# Patient Record
Sex: MALE | Race: WHITE | NOT HISPANIC OR LATINO | Employment: UNEMPLOYED | URBAN - METROPOLITAN AREA
[De-identification: names, ages, dates, MRNs, and addresses within clinical notes are randomized per-mention and may not be internally consistent; named-entity substitution may affect disease eponyms.]

---

## 2019-01-02 ENCOUNTER — HOSPITAL ENCOUNTER (INPATIENT)
Facility: CLINIC | Age: 54
LOS: 4 days | Discharge: HOME | End: 2019-01-07
Attending: EMERGENCY MEDICINE | Admitting: INTERNAL MEDICINE
Payer: COMMERCIAL

## 2019-01-02 DIAGNOSIS — N40.1 BENIGN PROSTATIC HYPERPLASIA WITH LOWER URINARY TRACT SYMPTOMS, SYMPTOM DETAILS UNSPECIFIED: Primary | ICD-10-CM

## 2019-01-02 DIAGNOSIS — R10.32 LLQ ABDOMINAL PAIN: ICD-10-CM

## 2019-01-02 DIAGNOSIS — K57.92 DIVERTICULITIS: ICD-10-CM

## 2019-01-02 LAB
ALBUMIN UR-MCNC: 10 MG/DL
ANION GAP SERPL CALCULATED.3IONS-SCNC: 7 MMOL/L (ref 3–14)
APPEARANCE UR: CLEAR
BASOPHILS # BLD AUTO: 0 10E9/L (ref 0–0.2)
BASOPHILS NFR BLD AUTO: 0.1 %
BILIRUB UR QL STRIP: NEGATIVE
BUN SERPL-MCNC: 20 MG/DL (ref 7–30)
CALCIUM SERPL-MCNC: 8.6 MG/DL (ref 8.5–10.1)
CHLORIDE SERPL-SCNC: 105 MMOL/L (ref 94–109)
CO2 SERPL-SCNC: 27 MMOL/L (ref 20–32)
COLOR UR AUTO: YELLOW
CREAT SERPL-MCNC: 1.01 MG/DL (ref 0.66–1.25)
DIFFERENTIAL METHOD BLD: ABNORMAL
EOSINOPHIL # BLD AUTO: 0.1 10E9/L (ref 0–0.7)
EOSINOPHIL NFR BLD AUTO: 1 %
ERYTHROCYTE [DISTWIDTH] IN BLOOD BY AUTOMATED COUNT: 14.7 % (ref 10–15)
GFR SERPL CREATININE-BSD FRML MDRD: 84 ML/MIN/{1.73_M2}
GLUCOSE SERPL-MCNC: 81 MG/DL (ref 70–99)
GLUCOSE UR STRIP-MCNC: NEGATIVE MG/DL
HCT VFR BLD AUTO: 41.4 % (ref 40–53)
HGB BLD-MCNC: 13.7 G/DL (ref 13.3–17.7)
HGB UR QL STRIP: NEGATIVE
IMM GRANULOCYTES # BLD: 0 10E9/L (ref 0–0.4)
IMM GRANULOCYTES NFR BLD: 0.3 %
KETONES UR STRIP-MCNC: 10 MG/DL
LACTATE BLD-SCNC: 0.7 MMOL/L (ref 0.7–2)
LEUKOCYTE ESTERASE UR QL STRIP: ABNORMAL
LYMPHOCYTES # BLD AUTO: 2.7 10E9/L (ref 0.8–5.3)
LYMPHOCYTES NFR BLD AUTO: 24.2 %
MCH RBC QN AUTO: 29.5 PG (ref 26.5–33)
MCHC RBC AUTO-ENTMCNC: 33.1 G/DL (ref 31.5–36.5)
MCV RBC AUTO: 89 FL (ref 78–100)
MONOCYTES # BLD AUTO: 0.9 10E9/L (ref 0–1.3)
MONOCYTES NFR BLD AUTO: 8.4 %
MUCOUS THREADS #/AREA URNS LPF: PRESENT /LPF
NEUTROPHILS # BLD AUTO: 7.4 10E9/L (ref 1.6–8.3)
NEUTROPHILS NFR BLD AUTO: 66 %
NITRATE UR QL: NEGATIVE
NRBC # BLD AUTO: 0 10*3/UL
NRBC BLD AUTO-RTO: 0 /100
PH UR STRIP: 6 PH (ref 5–7)
PLATELET # BLD AUTO: 178 10E9/L (ref 150–450)
POTASSIUM SERPL-SCNC: 3.5 MMOL/L (ref 3.4–5.3)
RBC # BLD AUTO: 4.65 10E12/L (ref 4.4–5.9)
RBC #/AREA URNS AUTO: 4 /HPF (ref 0–2)
SODIUM SERPL-SCNC: 139 MMOL/L (ref 133–144)
SOURCE: ABNORMAL
SP GR UR STRIP: 1.03 (ref 1–1.03)
SQUAMOUS #/AREA URNS AUTO: <1 /HPF (ref 0–1)
TRANS CELLS #/AREA URNS HPF: <1 /HPF (ref 0–1)
UROBILINOGEN UR STRIP-MCNC: NORMAL MG/DL (ref 0–2)
WBC # BLD AUTO: 11.2 10E9/L (ref 4–11)
WBC #/AREA URNS AUTO: 5 /HPF (ref 0–5)

## 2019-01-02 PROCEDURE — 99285 EMERGENCY DEPT VISIT HI MDM: CPT | Mod: 25 | Performed by: EMERGENCY MEDICINE

## 2019-01-02 PROCEDURE — 85025 COMPLETE CBC W/AUTO DIFF WBC: CPT | Performed by: EMERGENCY MEDICINE

## 2019-01-02 PROCEDURE — 81001 URINALYSIS AUTO W/SCOPE: CPT | Performed by: EMERGENCY MEDICINE

## 2019-01-02 PROCEDURE — 96375 TX/PRO/DX INJ NEW DRUG ADDON: CPT | Performed by: EMERGENCY MEDICINE

## 2019-01-02 PROCEDURE — 83605 ASSAY OF LACTIC ACID: CPT | Performed by: EMERGENCY MEDICINE

## 2019-01-02 PROCEDURE — 96361 HYDRATE IV INFUSION ADD-ON: CPT | Performed by: EMERGENCY MEDICINE

## 2019-01-02 PROCEDURE — 99285 EMERGENCY DEPT VISIT HI MDM: CPT | Mod: Z6 | Performed by: EMERGENCY MEDICINE

## 2019-01-02 PROCEDURE — 80048 BASIC METABOLIC PNL TOTAL CA: CPT | Performed by: EMERGENCY MEDICINE

## 2019-01-02 PROCEDURE — 25000128 H RX IP 250 OP 636: Performed by: EMERGENCY MEDICINE

## 2019-01-02 RX ORDER — ONDANSETRON 2 MG/ML
4 INJECTION INTRAMUSCULAR; INTRAVENOUS EVERY 30 MIN PRN
Status: DISCONTINUED | OUTPATIENT
Start: 2019-01-02 | End: 2019-01-07 | Stop reason: HOSPADM

## 2019-01-02 RX ORDER — HYDROMORPHONE HYDROCHLORIDE 1 MG/ML
0.5 INJECTION, SOLUTION INTRAMUSCULAR; INTRAVENOUS; SUBCUTANEOUS
Status: COMPLETED | OUTPATIENT
Start: 2019-01-02 | End: 2019-01-02

## 2019-01-02 RX ORDER — SODIUM CHLORIDE 9 MG/ML
1000 INJECTION, SOLUTION INTRAVENOUS CONTINUOUS
Status: DISCONTINUED | OUTPATIENT
Start: 2019-01-02 | End: 2019-01-03

## 2019-01-02 RX ADMIN — ONDANSETRON 4 MG: 2 INJECTION INTRAMUSCULAR; INTRAVENOUS at 23:13

## 2019-01-02 RX ADMIN — SODIUM CHLORIDE 1000 ML: 9 INJECTION, SOLUTION INTRAVENOUS at 23:29

## 2019-01-02 RX ADMIN — Medication 0.5 MG: at 23:13

## 2019-01-02 RX ADMIN — SODIUM CHLORIDE 1000 ML: 9 INJECTION, SOLUTION INTRAVENOUS at 23:13

## 2019-01-02 ASSESSMENT — MIFFLIN-ST. JEOR: SCORE: 1441.38

## 2019-01-03 ENCOUNTER — APPOINTMENT (OUTPATIENT)
Dept: CT IMAGING | Facility: CLINIC | Age: 54
End: 2019-01-03
Payer: COMMERCIAL

## 2019-01-03 PROBLEM — K57.92 DIVERTICULITIS: Status: ACTIVE | Noted: 2019-01-03

## 2019-01-03 LAB — GLUCOSE BLDC GLUCOMTR-MCNC: 112 MG/DL (ref 70–99)

## 2019-01-03 PROCEDURE — 25000128 H RX IP 250 OP 636: Performed by: STUDENT IN AN ORGANIZED HEALTH CARE EDUCATION/TRAINING PROGRAM

## 2019-01-03 PROCEDURE — 96367 TX/PROPH/DG ADDL SEQ IV INF: CPT | Performed by: EMERGENCY MEDICINE

## 2019-01-03 PROCEDURE — 12000001 ZZH R&B MED SURG/OB UMMC

## 2019-01-03 PROCEDURE — 25000128 H RX IP 250 OP 636: Performed by: INTERNAL MEDICINE

## 2019-01-03 PROCEDURE — 87040 BLOOD CULTURE FOR BACTERIA: CPT | Performed by: INTERNAL MEDICINE

## 2019-01-03 PROCEDURE — 36415 COLL VENOUS BLD VENIPUNCTURE: CPT | Performed by: INTERNAL MEDICINE

## 2019-01-03 PROCEDURE — 40000141 ZZH STATISTIC PERIPHERAL IV START W/O US GUIDANCE

## 2019-01-03 PROCEDURE — 25000128 H RX IP 250 OP 636: Performed by: EMERGENCY MEDICINE

## 2019-01-03 PROCEDURE — 00000146 ZZHCL STATISTIC GLUCOSE BY METER IP

## 2019-01-03 PROCEDURE — 25000132 ZZH RX MED GY IP 250 OP 250 PS 637: Performed by: STUDENT IN AN ORGANIZED HEALTH CARE EDUCATION/TRAINING PROGRAM

## 2019-01-03 PROCEDURE — 99223 1ST HOSP IP/OBS HIGH 75: CPT | Mod: AI | Performed by: INTERNAL MEDICINE

## 2019-01-03 PROCEDURE — 40000358 ZZHCL STATISTIC DRUG SCREEN MULTIPLE (METRO): Performed by: STUDENT IN AN ORGANIZED HEALTH CARE EDUCATION/TRAINING PROGRAM

## 2019-01-03 PROCEDURE — 25000125 ZZHC RX 250: Performed by: EMERGENCY MEDICINE

## 2019-01-03 PROCEDURE — 96375 TX/PRO/DX INJ NEW DRUG ADDON: CPT | Performed by: EMERGENCY MEDICINE

## 2019-01-03 PROCEDURE — 96365 THER/PROPH/DIAG IV INF INIT: CPT | Performed by: EMERGENCY MEDICINE

## 2019-01-03 PROCEDURE — 80307 DRUG TEST PRSMV CHEM ANLYZR: CPT | Performed by: STUDENT IN AN ORGANIZED HEALTH CARE EDUCATION/TRAINING PROGRAM

## 2019-01-03 PROCEDURE — 74177 CT ABD & PELVIS W/CONTRAST: CPT

## 2019-01-03 RX ORDER — HYDROMORPHONE HCL/0.9% NACL/PF 0.2MG/0.2
0.2 SYRINGE (ML) INTRAVENOUS EVERY 4 HOURS PRN
Status: DISCONTINUED | OUTPATIENT
Start: 2019-01-03 | End: 2019-01-05

## 2019-01-03 RX ORDER — IOPAMIDOL 755 MG/ML
86 INJECTION, SOLUTION INTRAVASCULAR ONCE
Status: COMPLETED | OUTPATIENT
Start: 2019-01-03 | End: 2019-01-03

## 2019-01-03 RX ORDER — TAMSULOSIN HYDROCHLORIDE 0.4 MG/1
0.4 CAPSULE ORAL DAILY
Status: DISCONTINUED | OUTPATIENT
Start: 2019-01-03 | End: 2019-01-07 | Stop reason: HOSPADM

## 2019-01-03 RX ORDER — SODIUM CHLORIDE, SODIUM LACTATE, POTASSIUM CHLORIDE, CALCIUM CHLORIDE 600; 310; 30; 20 MG/100ML; MG/100ML; MG/100ML; MG/100ML
INJECTION, SOLUTION INTRAVENOUS CONTINUOUS
Status: ACTIVE | OUTPATIENT
Start: 2019-01-03 | End: 2019-01-04

## 2019-01-03 RX ORDER — PIPERACILLIN SODIUM, TAZOBACTAM SODIUM 3; .375 G/15ML; G/15ML
3.38 INJECTION, POWDER, LYOPHILIZED, FOR SOLUTION INTRAVENOUS EVERY 6 HOURS
Status: DISCONTINUED | OUTPATIENT
Start: 2019-01-03 | End: 2019-01-06

## 2019-01-03 RX ORDER — PIPERACILLIN SODIUM, TAZOBACTAM SODIUM 3; .375 G/15ML; G/15ML
3.38 INJECTION, POWDER, LYOPHILIZED, FOR SOLUTION INTRAVENOUS ONCE
Status: COMPLETED | OUTPATIENT
Start: 2019-01-03 | End: 2019-01-03

## 2019-01-03 RX ORDER — KETOROLAC TROMETHAMINE 30 MG/ML
30 INJECTION, SOLUTION INTRAMUSCULAR; INTRAVENOUS ONCE
Status: COMPLETED | OUTPATIENT
Start: 2019-01-03 | End: 2019-01-03

## 2019-01-03 RX ORDER — CIPROFLOXACIN 2 MG/ML
400 INJECTION, SOLUTION INTRAVENOUS ONCE
Status: COMPLETED | OUTPATIENT
Start: 2019-01-03 | End: 2019-01-03

## 2019-01-03 RX ORDER — NALOXONE HYDROCHLORIDE 0.4 MG/ML
.1-.4 INJECTION, SOLUTION INTRAMUSCULAR; INTRAVENOUS; SUBCUTANEOUS
Status: DISCONTINUED | OUTPATIENT
Start: 2019-01-03 | End: 2019-01-07 | Stop reason: HOSPADM

## 2019-01-03 RX ADMIN — PIPERACILLIN SODIUM,TAZOBACTAM SODIUM 3.38 G: 3; .375 INJECTION, POWDER, FOR SOLUTION INTRAVENOUS at 17:35

## 2019-01-03 RX ADMIN — PIPERACILLIN SODIUM,TAZOBACTAM SODIUM 3.38 G: 3; .375 INJECTION, POWDER, FOR SOLUTION INTRAVENOUS at 22:51

## 2019-01-03 RX ADMIN — CIPROFLOXACIN 400 MG: 2 INJECTION, SOLUTION INTRAVENOUS at 03:45

## 2019-01-03 RX ADMIN — SODIUM CHLORIDE, POTASSIUM CHLORIDE, SODIUM LACTATE AND CALCIUM CHLORIDE: 600; 310; 30; 20 INJECTION, SOLUTION INTRAVENOUS at 11:31

## 2019-01-03 RX ADMIN — TAMSULOSIN HYDROCHLORIDE 0.4 MG: 0.4 CAPSULE ORAL at 12:20

## 2019-01-03 RX ADMIN — KETOROLAC TROMETHAMINE 30 MG: 30 INJECTION, SOLUTION INTRAMUSCULAR at 11:05

## 2019-01-03 RX ADMIN — SODIUM CHLORIDE, POTASSIUM CHLORIDE, SODIUM LACTATE AND CALCIUM CHLORIDE: 600; 310; 30; 20 INJECTION, SOLUTION INTRAVENOUS at 18:51

## 2019-01-03 RX ADMIN — SODIUM CHLORIDE, POTASSIUM CHLORIDE, SODIUM LACTATE AND CALCIUM CHLORIDE: 600; 310; 30; 20 INJECTION, SOLUTION INTRAVENOUS at 22:12

## 2019-01-03 RX ADMIN — METRONIDAZOLE 500 MG: 500 INJECTION, SOLUTION INTRAVENOUS at 04:33

## 2019-01-03 RX ADMIN — PIPERACILLIN SODIUM,TAZOBACTAM SODIUM 3.38 G: 3; .375 INJECTION, POWDER, FOR SOLUTION INTRAVENOUS at 11:31

## 2019-01-03 RX ADMIN — SODIUM CHLORIDE, PRESERVATIVE FREE 72 ML: 5 INJECTION INTRAVENOUS at 02:02

## 2019-01-03 RX ADMIN — PIPERACILLIN SODIUM,TAZOBACTAM SODIUM 3.38 G: 3; .375 INJECTION, POWDER, FOR SOLUTION INTRAVENOUS at 04:45

## 2019-01-03 RX ADMIN — Medication 0.2 MG: at 20:07

## 2019-01-03 RX ADMIN — IOPAMIDOL 86 ML: 755 INJECTION, SOLUTION INTRAVENOUS at 02:02

## 2019-01-03 ASSESSMENT — ACTIVITIES OF DAILY LIVING (ADL)
RETIRED_COMMUNICATION: 0-->UNDERSTANDS/COMMUNICATES WITHOUT DIFFICULTY
ADLS_ACUITY_SCORE: 17
ADLS_ACUITY_SCORE: 17
DRESS: 0-->INDEPENDENT
SWALLOWING: 0-->SWALLOWS FOODS/LIQUIDS WITHOUT DIFFICULTY
ADLS_ACUITY_SCORE: 12
TOILETING: 0-->INDEPENDENT
COGNITION: 0 - NO COGNITION ISSUES REPORTED
BATHING: 0-->INDEPENDENT
RETIRED_EATING: 0-->INDEPENDENT
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
## 2019-01-03 NOTE — ED TRIAGE NOTES
Patient presents via EMS for c/o abdominal pain. Patient reports abdominal pain since yesterday. Denies N/V, reports 3-4 soft BMs today. Patient states he was diagnosed with diverticulitis one month ago.  during triage, AOVSS.

## 2019-01-03 NOTE — PROGRESS NOTES
COLON & RECTAL SURGERY PROGRESS NOTE  01/03/2019     Subjective  No acute events since seen in ED overnight by overnight resident. Pain well controlled and mostly in LLQ. No n/v. Does feel antsy and like he wants to walk; c/o aches all over. No other complaints.      Objective  Temp:  [98  F (36.7  C)] 98  F (36.7  C)  Pulse:  [] 84  Resp:  [16] 16  BP: (101-124)/(64-85) 101/71  SpO2:  [93 %-99 %] 96 %    General: awake, alert, no acute distress, laying comfortably in bed   CV: warm, well perfused, RRR   Pulm: breathing comfortably on room air   Abdomen: soft, non-distended, exquisitely tender to moderate palpation with focal guarding in the LLQ, minimally tender throughout remainder of abdomen, no generalized peritonitis   Extremities: no edema, moving all extremities spontaneously and without apparent deficit     I/O last 3 completed shifts:  In: 43504 [I.V.:1000; IV Piggyback:14489]  Out: -     Labs:  Recent Labs   Lab 01/02/19  2217   WBC 11.2*   HGB 13.7        Recent Labs   Lab 01/02/19  2217      POTASSIUM 3.5   CHLORIDE 105   CO2 27   BUN 20   CR 1.01   GLC 81   GUILLERMO 8.6       Assessment/Plan  53 year old  with a h/o TBI and recurrent diverticulitis (1-2 attacks every 2 years, most recently 1 month ago treated with po cipro/Flagyl) who presented for admission on 1/2/2019 with recurrent complicated diverticulitis with a 2.3 x 1.4 x 2.2 cm para-sigmoid colonic abscess ([place] I).    - IV Zosyn  - NPO until patient having almost no pain, we will continue to follow for diet advancement recommendations  - Trend WBC  - Patient will need colonoscopy as outpatient in 6-8 weeks.  - Consider Social Work/Care Management evaluation for assistance at home; patient reports difficulty managing health needs due to memory issues from TBI.  - Remainder of cares per primary team, appreciate cares.      Patient and plan discussed with fellow Dr. Dyer, who will discuss with attending physician.  - - -  - - - - - - - - - - - - - - -  Jovana Euceda MD  General Surgery PGY-3  See [facility] for on call information prior to paging me directly. Pager [phone].

## 2019-01-03 NOTE — PLAN OF CARE
"/65   Pulse 92   Temp 98.7  F (37.1  C) (Oral)   Resp 16   Ht 1.702 m (5' 7\")   Wt 64.3 kg (141 lb 11.2 oz)   SpO2 97%   BMI 22.19 kg/m      Neuro: A&O x4; cooperative with cares; flat affect  Cardiac: WNL  Respiratory: 97% RA; denies SOB  GI/: Voiding independently; faint BS; no BM for shift  Skin: appropriate for ethnicity  Pain: Managed with PRN dilaudid  LDA: PIV /hr and IV abx as prescribed  Activity: Up independently  Diet/Appetite: NPO with meds  Plan: Continue with plan of care    New for shift: Pt went outside and came back with a non-functioning PIV; MD notified; UA tox screen sent and awaiting results  "

## 2019-01-03 NOTE — ED NOTES
"This RN to bedside, pt reporting pain to R lower face, chin after abx infusion. Denies itchiness, hives, SOB, rash. Reports skin there feels \"dry\". Slight redness noted to R lower face and chin, non raised. MD notified, en route for assessment. VSS. Will continue to monitor.   "
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
## 2019-01-04 LAB
ACETAMINOPHEN QUAL: NEGATIVE
AMANTADINE: NEGATIVE
AMITRIPTYLINE QUAL: NEGATIVE
AMOXAPINE: NEGATIVE
AMPHETAMINES QUAL: POSITIVE
ANION GAP SERPL CALCULATED.3IONS-SCNC: 7 MMOL/L (ref 3–14)
ATROPINE: NEGATIVE
BENZODIAZ UR QL: NEGATIVE
BUN SERPL-MCNC: 7 MG/DL (ref 7–30)
CAFFEINE QUAL: POSITIVE
CALCIUM SERPL-MCNC: 8.5 MG/DL (ref 8.5–10.1)
CANNABINOIDS UR QL SCN: POSITIVE
CARBAMAZEPINE QUAL: NEGATIVE
CHLORIDE SERPL-SCNC: 106 MMOL/L (ref 94–109)
CHLORPHENIRAMINE: NEGATIVE
CHLORPROMAZINE: NEGATIVE
CITALOPRAM QUAL: NEGATIVE
CLOMIPRAMINE QUAL: NEGATIVE
CO2 SERPL-SCNC: 25 MMOL/L (ref 20–32)
COCAINE QUAL: NEGATIVE
COCAINE UR QL: NEGATIVE
CODEINE QUAL: NEGATIVE
CREAT SERPL-MCNC: 0.99 MG/DL (ref 0.66–1.25)
DESIPRAMINE QUAL: NEGATIVE
DEXTROMETHORPHAN: NEGATIVE
DIPHENHYDRAMINE: NEGATIVE
DOXEPIN/METABOLITE: NEGATIVE
DOXYLAMINE: NEGATIVE
EPHEDRINE OR PSEUDO: NEGATIVE
ERYTHROCYTE [DISTWIDTH] IN BLOOD BY AUTOMATED COUNT: 14.5 % (ref 10–15)
FENTANYL QUAL: NEGATIVE
FLUOXETINE AND METAB: NEGATIVE
GFR SERPL CREATININE-BSD FRML MDRD: 86 ML/MIN/{1.73_M2}
GLUCOSE SERPL-MCNC: 82 MG/DL (ref 70–99)
HCT VFR BLD AUTO: 38.2 % (ref 40–53)
HGB BLD-MCNC: 12.8 G/DL (ref 13.3–17.7)
HYDROCODONE QUAL: NEGATIVE
HYDROMORPHONE QUAL: NEGATIVE
IBUPROFEN QUAL: NEGATIVE
IMIPRAMINE QUAL: NEGATIVE
LAMOTRIGINE QUAL: NEGATIVE
LOXAPINE: NEGATIVE
MAPROTYLINE: NEGATIVE
MCH RBC QN AUTO: 29.7 PG (ref 26.5–33)
MCHC RBC AUTO-ENTMCNC: 33.5 G/DL (ref 31.5–36.5)
MCV RBC AUTO: 89 FL (ref 78–100)
MDMA QUAL: NEGATIVE
MEPERIDINE QUAL: NEGATIVE
METHAMPHETAMINE: POSITIVE
METHODONE QUAL: NEGATIVE
MORPHINE QUAL: NEGATIVE
NICOTINE: POSITIVE
NORTRIPTYLINE QUAL: NEGATIVE
OLANZAPINE QUAL: NEGATIVE
OPIATES UR QL SCN: NEGATIVE
OXYCODONE QUAL: NEGATIVE
PENTAZOCINE: NEGATIVE
PHENCYCLIDINE QUAL: NEGATIVE
PHENMETRAZINE: NEGATIVE
PHENTERMINE: NEGATIVE
PHENYLBUTAZONE: NEGATIVE
PHENYLPROPANOLAMINE: NEGATIVE
PLATELET # BLD AUTO: 203 10E9/L (ref 150–450)
POTASSIUM SERPL-SCNC: 3.8 MMOL/L (ref 3.4–5.3)
PROPOXPHENE QUAL: NEGATIVE
PROPRANOLOL QUAL: NEGATIVE
PYRILAMINE: NEGATIVE
RBC # BLD AUTO: 4.31 10E12/L (ref 4.4–5.9)
SALICYLATE QUAL: NEGATIVE
SODIUM SERPL-SCNC: 138 MMOL/L (ref 133–144)
THEOBROMINE: POSITIVE
TOPIRAMATE QUAL: NEGATIVE
TRIMIPRAMINE QUAL: NEGATIVE
VENLAFAXINE QUAL: NEGATIVE
WBC # BLD AUTO: 8.8 10E9/L (ref 4–11)

## 2019-01-04 PROCEDURE — 85027 COMPLETE CBC AUTOMATED: CPT | Performed by: INTERNAL MEDICINE

## 2019-01-04 PROCEDURE — 25000128 H RX IP 250 OP 636: Performed by: INTERNAL MEDICINE

## 2019-01-04 PROCEDURE — 25000128 H RX IP 250 OP 636: Performed by: STUDENT IN AN ORGANIZED HEALTH CARE EDUCATION/TRAINING PROGRAM

## 2019-01-04 PROCEDURE — 12000001 ZZH R&B MED SURG/OB UMMC

## 2019-01-04 PROCEDURE — 36415 COLL VENOUS BLD VENIPUNCTURE: CPT | Performed by: INTERNAL MEDICINE

## 2019-01-04 PROCEDURE — 99233 SBSQ HOSP IP/OBS HIGH 50: CPT | Mod: GC | Performed by: INTERNAL MEDICINE

## 2019-01-04 PROCEDURE — 25000132 ZZH RX MED GY IP 250 OP 250 PS 637: Performed by: STUDENT IN AN ORGANIZED HEALTH CARE EDUCATION/TRAINING PROGRAM

## 2019-01-04 PROCEDURE — 80048 BASIC METABOLIC PNL TOTAL CA: CPT | Performed by: INTERNAL MEDICINE

## 2019-01-04 RX ORDER — KETOROLAC TROMETHAMINE 30 MG/ML
30 INJECTION, SOLUTION INTRAMUSCULAR; INTRAVENOUS EVERY 6 HOURS PRN
Status: DISPENSED | OUTPATIENT
Start: 2019-01-04 | End: 2019-01-06

## 2019-01-04 RX ORDER — SODIUM CHLORIDE, SODIUM LACTATE, POTASSIUM CHLORIDE, CALCIUM CHLORIDE 600; 310; 30; 20 MG/100ML; MG/100ML; MG/100ML; MG/100ML
INJECTION, SOLUTION INTRAVENOUS CONTINUOUS
Status: DISCONTINUED | OUTPATIENT
Start: 2019-01-04 | End: 2019-01-05

## 2019-01-04 RX ADMIN — PIPERACILLIN SODIUM,TAZOBACTAM SODIUM 3.38 G: 3; .375 INJECTION, POWDER, FOR SOLUTION INTRAVENOUS at 16:57

## 2019-01-04 RX ADMIN — KETOROLAC TROMETHAMINE 30 MG: 30 INJECTION, SOLUTION INTRAMUSCULAR at 15:53

## 2019-01-04 RX ADMIN — SODIUM CHLORIDE, POTASSIUM CHLORIDE, SODIUM LACTATE AND CALCIUM CHLORIDE: 600; 310; 30; 20 INJECTION, SOLUTION INTRAVENOUS at 11:54

## 2019-01-04 RX ADMIN — Medication 0.2 MG: at 19:45

## 2019-01-04 RX ADMIN — PIPERACILLIN SODIUM,TAZOBACTAM SODIUM 3.38 G: 3; .375 INJECTION, POWDER, FOR SOLUTION INTRAVENOUS at 05:58

## 2019-01-04 RX ADMIN — PIPERACILLIN SODIUM,TAZOBACTAM SODIUM 3.38 G: 3; .375 INJECTION, POWDER, FOR SOLUTION INTRAVENOUS at 11:01

## 2019-01-04 RX ADMIN — TAMSULOSIN HYDROCHLORIDE 0.4 MG: 0.4 CAPSULE ORAL at 08:08

## 2019-01-04 RX ADMIN — Medication 0.2 MG: at 01:31

## 2019-01-04 RX ADMIN — Medication 0.2 MG: at 11:12

## 2019-01-04 RX ADMIN — SODIUM CHLORIDE, POTASSIUM CHLORIDE, SODIUM LACTATE AND CALCIUM CHLORIDE: 600; 310; 30; 20 INJECTION, SOLUTION INTRAVENOUS at 15:49

## 2019-01-04 RX ADMIN — SODIUM CHLORIDE, POTASSIUM CHLORIDE, SODIUM LACTATE AND CALCIUM CHLORIDE: 600; 310; 30; 20 INJECTION, SOLUTION INTRAVENOUS at 06:55

## 2019-01-04 RX ADMIN — PIPERACILLIN SODIUM,TAZOBACTAM SODIUM 3.38 G: 3; .375 INJECTION, POWDER, FOR SOLUTION INTRAVENOUS at 22:59

## 2019-01-04 ASSESSMENT — ACTIVITIES OF DAILY LIVING (ADL)
ADLS_ACUITY_SCORE: 12

## 2019-01-04 NOTE — PLAN OF CARE
"BP 96/54 (BP Location: Left arm)   Pulse 89   Temp 99.4  F (37.4  C) (Oral)   Resp 16   Ht 1.702 m (5' 7\")   Wt 64.3 kg (141 lb 11.2 oz)   SpO2 95%   BMI 22.19 kg/m        Neuro:  Alert and oriented. Slept between most cares, aroused to voice.  GI/: Voiding not saving, +flatus, +BM today.  Diet: NPO x meds  Incisions/Drains: n/a  IV Access: PIV on left infusing IVMF per MAR  Labs: Drugs of abuse tox screen pending. + for cannabinoids  Pain: Medicated x1 with PRN dilaudid.  New changes this shift: Patient appears to rest in bed most shift  Activity: Up ad edenilson independently,   Plan: Continue IV abx, continue POC.  "

## 2019-01-04 NOTE — PLAN OF CARE
"Activity: Up ad edenilson independently, off floor twice for \"fresh air\" per pt report, when arrived back to unit 7B staff noted his PIV was improperly connected to his IVMF tubing.  Neuro: Slept between most cares, aroused to voice.  GI/: Voiding not saving, +flatus, +BM today.  Diet: NPO x meds  Incisions/Drains: n/a  IV Access: PIV on left infusing IVMF per MAR  Labs: Drugs of abuse tox screen pending. + for cannabinoids  Pain: Medicated x1 with PRN dilaudid.  New changes this shift: Medical team informed of low BP and patient's off-unit status.  Bolus administered per order.  Plan: Continue IV abx, continue POC.    "

## 2019-01-04 NOTE — PROGRESS NOTES
Colorectal Surgery Progress Note  HD#3    Subjective:  No acute events overnight. Patient noted to have left the floor twice, returning with PIV improperly connected, tox screen pending. Patient reports not feeling great, no changes compared to yesterday. No nausea.vomiting. +flatus, Last BM yesterday- loose and small. Discussed with patient options if he does not improve, and patient expressed that he is adamant about no surgical interventions.    Vitals:  Vitals:    01/03/19 0800 01/03/19 0954 01/03/19 1700 01/03/19 2354   BP: 95/86 103/65 (!) 87/48 96/54   BP Location:    Left arm   Pulse: 96 92 100 89   Resp:  16 19 16   Temp:  98.7  F (37.1  C) 100.7  F (38.2  C) 99.4  F (37.4  C)   TempSrc:  Oral Oral Oral   SpO2: 99% 97% 90% 95%   Weight:       Height:         I/O:  I/O last 3 completed shifts:  In: -   Out: 500 [Urine:500]    Physical Exam:  Gen: AAOx3, NAD, laying in bed, looks tired  Pulm: Non-labored breathing on room air  Abd: Soft, non-distended, mildly tender to palpation throughout with guarding, worse in LLQ. No peritonitis. Old scar infraumbilical  Ext:  Warm and well-perfused, no edema    BMP  Recent Labs   Lab 01/02/19  2217      POTASSIUM 3.5   CHLORIDE 105   CO2 27   BUN 20   CR 1.01   GLC 81     CBC  Recent Labs   Lab 01/04/19  0653 01/02/19  2217   WBC 8.8 11.2*   HGB 12.8* 13.7   HCT 38.2* 41.4    178         ASSESSMENT: This is a 53 year old male with history of a TBI and recurrent diverticulitis (1-2 attacks every 2 years, most [facility]), who is HD#3 for admission for recurrent complicated diverticulitis with a 2.3 x 1.4 x 2.2 cm para-sigmoid colonic abscess ([place] I). Stable, still c/o pain, but overall appears to be slightly improved since admission.    - Continue IV Zosyn  - Continue NPO, surgery will give diet recommendations daily.   - Notify surgery with any acute changes in exam or clinical picture  - Remainder of cares per  primary team, appreciate cares  - Patient will need colonoscopy outpatient in 6-8 weeks.    Phillip Antony, PGY1  General Surgery Resident  Colon and Rectal Surgery    Patient was seen and discussed with Dr. Dyer, CRS Fellow, who discussed with staff, Dr. Lucas.

## 2019-01-04 NOTE — PLAN OF CARE
"/64 (BP Location: Left arm)   Pulse 89   Temp 97.5  F (36.4  C) (Oral)   Resp 16   Ht 1.702 m (5' 7\")   Wt 64.3 kg (141 lb 11.2 oz)   SpO2 96%   BMI 22.19 kg/m      Neuro: A&O x4; cooperative with cares  Cardiac: WNL  Respiratory: 96% RA; denies SOB  GI/: Voiding independently; faint BS; no BM for shift; abdomen distended  Skin: Appropriate for ethnicity  Pain: PRN dilaudid/toradol available  LDA: PIV /hr and IV abx as prescribed  Activity: Up independently  Diet/Appetite: NPO except for meds  Plan: Continue with plan of care    "

## 2019-01-04 NOTE — PROGRESS NOTES
"[facility], [place]    Progress Note - [place] 2 Service        Date of Admission:  1/2/2019    Assessment & Plan   Grant Swift is a 53 year old male with history significant for diverticulitis admitted on 1/2/2019 with 1-2 week duration of LLQ abdominal discomfort with significant increase in pain over the last 2 days. CT abdomen w/ contrast demonstrates inflammatory changes and colonic wall thickening involving the sigmoid colon consistent with diverticulitis. Suspected rupture along the inferior margin of the sigmoid colon near the left inguinal canal with a small contained abscess measuring approximately 1.4 x 2.3 x 2.2cm. No indication for acute surgical intervention at this time, and plan is for continued observation with IV antibiotics and bowel rest. Patient expressed discontent with being in the hospital and stated \"I can just take antibiotics at home\", explained rationale of IV antibiotics with bowel rest and need for maintenance fluids in the interim for treatment of his condition.     # Acute colonic diverticulitis with sigmoid colon wall perforation and abscess:  Plan for conservative medical management with outpatient follow-up and colonoscopy in 6-8 weeks if condition improves:  - NPO  - Maintenance fluids  - IV Zosyn  - Pain management - IV Toradol and PRN Dilaudid     # Increased urination frequency/urgency with discomfort:  UA does not suggest infectious process such as UTI or prostatitis, likely 2/2 BPH:  - Continue tamsulosin      # Poor appetite:  Reported by patient at baseline with no significant change since onset of abdominal pain:  - Consider nutrition consult once patient able to resume PO intake     # Chronic issues:  - Tobacco use: encourage cessation, offer nicotine replacement.  - Difficulty managing uncontrolled underlying diseases likely contributing to recurrent admission: possible SW/OT consult.       Diet: NPO  Fluids: LR @125cc/hr  DVT " Prophylaxis: Pneumatic Compression Devices  Pierre Catheter: not present  Code Status: DNR/DNI    Disposition Plan   Expected discharge: 2 - 3 days, recommended to prior living arrangement once adequate pain management/ tolerating PO medications.  Entered: Margarito Stanton 01/04/2019, 6:56 AM       The patient's care was discussed with the Attending Physician, Dr. Ludin Platt.    Margarito Stanton  [facility] 2 Service  [facility], [place]  Pager: [phone]  Please see sticky note for cross cover information  ______________________________________________________________________    Interval History   FREDDIE, patient progressing well. Pain controlled with IV Toradol and PRN Dilaudid. Slightly hypotensive yesterday evening but asymptomatic and received 500cc LR bolus.     Data reviewed today: I reviewed all medications, new labs and imaging results over the last 24 hours. I personally reviewed no images or EKG's today.    Physical Exam   Vital Signs: Temp: 99.4  F (37.4  C) Temp src: Oral BP: 96/54 Pulse: 89   Resp: 16 SpO2: 95 % O2 Device: None (Room air)    Weight: 141 lbs 11.2 oz    Constitutional: Awake, alert, cooperative, no apparent distress, and appears stated age  Eyes: PERRL, extra ocular muscles intact, sclera clear, conjunctiva normal  ENT: Normocephalic, without obvious abnormality, atraumatic, oral pharynx with moist mucous membranes  Respiratory: No increased work of breathing, good air exchange, clear to auscultation bilaterally, no crackles or wheezing  Cardiovascular: Regular rate and rhythm, no M/G/R  GI: Normal bowel sounds, soft, non-distended, no masses palpated, no hepatosplenomegaly, LLQ quadrant sharp pain on palpation without rebound tenderness  Skin: normal skin color, texture, turgor  Musculoskeletal: No lower extremity pitting edema present, there is no redness, warmth, or swelling of the joints, full range of motion noted  Neurologic: Awake, alert, oriented to name, place and  time. Cranial nerves II-XII are grossly intact.  Neuropsychiatric: General: normal, calm and normal eye contact    Data   Data reviewed - Trending WBC down to 8.8 from 11.2 today.

## 2019-01-05 LAB
ANION GAP SERPL CALCULATED.3IONS-SCNC: 7 MMOL/L (ref 3–14)
BUN SERPL-MCNC: 8 MG/DL (ref 7–30)
CALCIUM SERPL-MCNC: 7.7 MG/DL (ref 8.5–10.1)
CHLORIDE SERPL-SCNC: 108 MMOL/L (ref 94–109)
CO2 SERPL-SCNC: 24 MMOL/L (ref 20–32)
CREAT SERPL-MCNC: 1.04 MG/DL (ref 0.66–1.25)
GFR SERPL CREATININE-BSD FRML MDRD: 81 ML/MIN/{1.73_M2}
GLUCOSE SERPL-MCNC: 76 MG/DL (ref 70–99)
POTASSIUM SERPL-SCNC: 3.7 MMOL/L (ref 3.4–5.3)
SODIUM SERPL-SCNC: 139 MMOL/L (ref 133–144)

## 2019-01-05 PROCEDURE — 99233 SBSQ HOSP IP/OBS HIGH 50: CPT | Mod: GC | Performed by: INTERNAL MEDICINE

## 2019-01-05 PROCEDURE — 25000128 H RX IP 250 OP 636: Performed by: INTERNAL MEDICINE

## 2019-01-05 PROCEDURE — 25000132 ZZH RX MED GY IP 250 OP 250 PS 637: Performed by: STUDENT IN AN ORGANIZED HEALTH CARE EDUCATION/TRAINING PROGRAM

## 2019-01-05 PROCEDURE — 36415 COLL VENOUS BLD VENIPUNCTURE: CPT | Performed by: INTERNAL MEDICINE

## 2019-01-05 PROCEDURE — 12000001 ZZH R&B MED SURG/OB UMMC

## 2019-01-05 PROCEDURE — 25000128 H RX IP 250 OP 636: Performed by: STUDENT IN AN ORGANIZED HEALTH CARE EDUCATION/TRAINING PROGRAM

## 2019-01-05 PROCEDURE — 80048 BASIC METABOLIC PNL TOTAL CA: CPT | Performed by: INTERNAL MEDICINE

## 2019-01-05 RX ORDER — ACETAMINOPHEN 325 MG/1
650 TABLET ORAL EVERY 6 HOURS
Status: DISCONTINUED | OUTPATIENT
Start: 2019-01-05 | End: 2019-01-07 | Stop reason: HOSPADM

## 2019-01-05 RX ADMIN — Medication 0.2 MG: at 04:43

## 2019-01-05 RX ADMIN — SODIUM CHLORIDE, POTASSIUM CHLORIDE, SODIUM LACTATE AND CALCIUM CHLORIDE: 600; 310; 30; 20 INJECTION, SOLUTION INTRAVENOUS at 08:37

## 2019-01-05 RX ADMIN — TAMSULOSIN HYDROCHLORIDE 0.4 MG: 0.4 CAPSULE ORAL at 08:38

## 2019-01-05 RX ADMIN — PIPERACILLIN SODIUM,TAZOBACTAM SODIUM 3.38 G: 3; .375 INJECTION, POWDER, FOR SOLUTION INTRAVENOUS at 04:43

## 2019-01-05 RX ADMIN — PIPERACILLIN SODIUM,TAZOBACTAM SODIUM 3.38 G: 3; .375 INJECTION, POWDER, FOR SOLUTION INTRAVENOUS at 11:21

## 2019-01-05 RX ADMIN — ACETAMINOPHEN 650 MG: 325 TABLET, FILM COATED ORAL at 09:27

## 2019-01-05 RX ADMIN — KETOROLAC TROMETHAMINE 30 MG: 30 INJECTION, SOLUTION INTRAMUSCULAR at 17:46

## 2019-01-05 RX ADMIN — KETOROLAC TROMETHAMINE 30 MG: 30 INJECTION, SOLUTION INTRAMUSCULAR at 09:27

## 2019-01-05 RX ADMIN — SODIUM CHLORIDE, POTASSIUM CHLORIDE, SODIUM LACTATE AND CALCIUM CHLORIDE: 600; 310; 30; 20 INJECTION, SOLUTION INTRAVENOUS at 00:18

## 2019-01-05 RX ADMIN — Medication 0.2 MG: at 00:26

## 2019-01-05 RX ADMIN — PIPERACILLIN SODIUM,TAZOBACTAM SODIUM 3.38 G: 3; .375 INJECTION, POWDER, FOR SOLUTION INTRAVENOUS at 23:08

## 2019-01-05 RX ADMIN — PIPERACILLIN SODIUM,TAZOBACTAM SODIUM 3.38 G: 3; .375 INJECTION, POWDER, FOR SOLUTION INTRAVENOUS at 17:12

## 2019-01-05 ASSESSMENT — MIFFLIN-ST. JEOR: SCORE: 1442.28

## 2019-01-05 ASSESSMENT — ACTIVITIES OF DAILY LIVING (ADL)
ADLS_ACUITY_SCORE: 12

## 2019-01-05 NOTE — PROGRESS NOTES
"[facility], [place]    Progress Note - [facility] 2 Service        Date of Admission:  1/2/2019    Assessment & Plan   Grant Swift is a 53 year old male with history significant for diverticulitis admitted on 1/2/2019 with 1-2 week duration of LLQ abdominal discomfort with significant increase in pain over the last 2 days. CT abdomen w/ contrast demonstrates inflammatory changes and colonic wall thickening involving the sigmoid colon consistent with diverticulitis. Suspected rupture along the inferior margin of the sigmoid colon near the left inguinal canal with a small contained abscess measuring approximately 1.4 x 2.3 x 2.2cm. No indication for acute surgical intervention at this time, and plan is for continued observation with IV antibiotics and bowel rest. Patient expressed discontent with being in the hospital and stated \"I can just take antibiotics at home\", explained rationale of IV antibiotics with bowel rest and need for maintenance fluids in the interim for treatment of his condition.     # Acute complicated sigmoid diverticulitis, Hinchey I  # Para-sigmoid abscess  Plan for conservative medical management with outpatient follow-up and colonoscopy in 6-8 weeks if condition improves:  - CLD, Advance as tolerate  - IV Zosyn  - Pain management - IV Toradol prn AND Tylenol scheduled  - Appreciate surgery recs     # Increased urination frequency/urgency with discomfort:  UA does not suggest infectious process such as UTI or prostatitis, likely 2/2 BPH:  - Continue tamsulosin      # Poor appetite:  Reported by patient at baseline with no significant change since onset of abdominal pain:  - Consider nutrition consult once patient able to resume PO intake     # Chronic issues:  - Tobacco use: encourage cessation, offer nicotine replacement.  - Difficulty managing uncontrolled underlying diseases likely contributing to recurrent admission: possible SW/OT consult.  - Drug abuse: DSA " positive for meth and marijuana     Diet: CLD, Advance as tolerate  Fluids: LR @125cc/hr - dc'ed 1/5  DVT Prophylaxis: Pneumatic Compression Devices  Pierre Catheter: not present  Code Status: DNR/DNI    Disposition Plan   Expected discharge: 2 - 3 days, recommended to prior living arrangement once adequate pain management/ tolerating PO medications.  Entered: Mike Reed MD 01/05/2019, 10:57 AM       The patient's care was discussed with the Attending Physician, Dr. Ludin Platt.    Margarito Kuhn [address] [place] [address], [place]  Pager: [phone]  Please see sticky note for cross cover information  ______________________________________________________________________    Interval History   NAEO. DSA positive for meth and marijuana. No fever or chills. Abd pain impeoved overnight. No n/v/d.    Data reviewed today: I reviewed all medications, new labs and imaging results over the last 24 hours. I personally reviewed no images or EKG's today.    Physical Exam   Vital Signs: Temp: 98.1  F (36.7  C) Temp src: Oral BP: 111/69 Pulse: 85   Resp: 16 SpO2: 93 % O2 Device: None (Room air)    Weight: 141 lbs 11.2 oz    Constitutional: Awake, alert, cooperative, no apparent distress, and appears stated age  Cardiovascular: RRR  Lungs: CTAB  GI: Normal bowel sounds, soft NTND. LLQ tenderness with no rebound/gaurding.  Skin: normal skin color, texture, turgor  Musculoskeletal: No lower extremity pitting edema  Neurologic: Awake, alert, oriented to name, place and time.    Data   Data reviewed

## 2019-01-05 NOTE — PLAN OF CARE
"Activity: Ambulated outside x1 for \"fresh air\" independently  Neuro: Slept between most cares. Awake long enough to ask for pain meds, then back asleep.  GI/: Voiding not saving, reports \"about 10\" bowel movements today, says they are becoming more loose.  Diet: NPO  Incisions/Drains: n/a  IV Access: PIV on right infusing LR at 125mL/hr between abx.  Labs: Results from drugs of abuse tox screen have returned, see lab activity for results.  Pain: Medicated with PRN toradol and PRN dilaudid per MAR.  New changes this shift: none  Plan: Continue POC.    "

## 2019-01-05 NOTE — PLAN OF CARE
VS:  Afebrile, VSS with RA.  LABS:  Minor changes to BMP results, no CBC today.  NEURO:  Alert, oriented x4.  Sleeping most of shift.    PAIN:  Pain control improved with iv Toradol and scheduled acetaminophen.  Pt reports discomfort in LLQ though not as intense as earlier this AM.  DIET:  Advanced to clears, pt taking adequate oral fluids.  Inquiring about advancing further, discussed with pt going slowly on diet per discussion with MD during rounds this AM.  GI:  Denies nausea.  Reports loose, watery BM x3 thus far today though forgets to save for assessment.    :  Voiding adequate amounts per pt report, not saving voids.  SKIN:  Declined full skin assessment.  ACTIVITY:  Up in room ad edenilson.  LINE:  MIV fluid discontinued, PIV saline locked between iv Zosyn doses.  PLAN:  Continue to assess pain and effectiveness of new pain regimen.  Assess BMs and update primary team prn - may need Cdiff testing.  Encourage activity and po fluid intake.  Continue current plan of care, update Hattie 2 team prn.

## 2019-01-05 NOTE — PROGRESS NOTES
Colorectal Surgery Progress Note  HD#4    Subjective:  No acute events overnight. Patient feels better this morning. Feels hungry. +flatus, +BM. Denies pain.    Vitals:  Vitals:    01/04/19 0700 01/04/19 1500 01/04/19 2356 01/05/19 0021   BP: 96/63 104/64 (!) 88/48 90/52   BP Location: Right leg Left arm Left arm Left arm   Pulse: 89 89 87    Resp: 16 16 16    Temp: 96.5  F (35.8  C) 97.5  F (36.4  C) 97.1  F (36.2  C)    TempSrc: Axillary Oral Oral    SpO2: 94% 96% 96%    Weight:       Height:         I/O:  I/O last 3 completed shifts:  In: 1972.92 [I.V.:1972.92]  Out: -     Physical Exam:  Gen: AAOx3, NAD  Pulm: Non-labored breathing on room air  Abd: Soft, non-distended, non-tender to palpation. Old infraumbilical scar.  Ext:  Warm and well-perfused, no edema    BMP  Recent Labs   Lab 01/05/19  0629 01/04/19  0653 01/02/19  2217    138 139   POTASSIUM 3.7 3.8 3.5   CHLORIDE 108 106 105   CO2 24 25 27   BUN 8 7 20   CR 1.04 0.99 1.01   GLC 76 82 81     CBC  Recent Labs   Lab 01/04/19  0653 01/02/19  2217   WBC 8.8 11.2*   HGB 12.8* 13.7   HCT 38.2* 41.4    178         ASSESSMENT: This is a 53 year old male with history of a TBI and recurrent diverticulitis (1-2 attacks every 2 years, [facility]), who is HD#4 for admission for recurrent complicated diverticulitis with a 2.3 x 1.4 x 2.2 cm para-sigmoid colonic abscess. Stable and improved.    - OK for clear liquid diet as long as he is pain free. If becomes nauseous, bloated, or has pain make NPO.  - Continue IV Zosyn.  - Notify surgery with any acute changes in exam or clinical picture.  - Remainder of cares per primary team, appreciate cares.  - Patient will need colonoscopy outpatient in 6-8 weeks.    Phillip Antony, PGY1  General Surgery Resident  Colon and Rectal Surgery    Patient was seen and discussed with Dr. Dyer, CRS Fellow, who discussed with staff.

## 2019-01-05 NOTE — PLAN OF CARE
Patient denies SOB, clear LS. VSS on RA. Hypo BS, +flatus.  Abdominal pain is well controlled and has improved as verbalized by pt. Dilaudid 0.2 mg given 2x. On Zosyn. Voiding not saving. MIVF at 125 ml/hr. Continue poc.   Vitals:    01/04/19 0700 01/04/19 1500 01/04/19 2356 01/05/19 0021   BP: 96/63 104/64 (!) 88/48 90/52   BP Location: Right leg Left arm Left arm Left arm   Pulse: 89 89 87    Resp: 16 16 16    Temp: 96.5  F (35.8  C) 97.5  F (36.4  C) 97.1  F (36.2  C)    TempSrc: Axillary Oral Oral    SpO2: 94% 96% 96%    Weight:       Height:

## 2019-01-06 LAB
ANION GAP SERPL CALCULATED.3IONS-SCNC: 6 MMOL/L (ref 3–14)
BUN SERPL-MCNC: 8 MG/DL (ref 7–30)
CALCIUM SERPL-MCNC: 8 MG/DL (ref 8.5–10.1)
CHLORIDE SERPL-SCNC: 111 MMOL/L (ref 94–109)
CO2 SERPL-SCNC: 24 MMOL/L (ref 20–32)
CREAT SERPL-MCNC: 0.99 MG/DL (ref 0.66–1.25)
ERYTHROCYTE [DISTWIDTH] IN BLOOD BY AUTOMATED COUNT: 14.3 % (ref 10–15)
GFR SERPL CREATININE-BSD FRML MDRD: 86 ML/MIN/{1.73_M2}
GLUCOSE SERPL-MCNC: 83 MG/DL (ref 70–99)
HCT VFR BLD AUTO: 37.7 % (ref 40–53)
HGB BLD-MCNC: 12.4 G/DL (ref 13.3–17.7)
MCH RBC QN AUTO: 29.2 PG (ref 26.5–33)
MCHC RBC AUTO-ENTMCNC: 32.9 G/DL (ref 31.5–36.5)
MCV RBC AUTO: 89 FL (ref 78–100)
PLATELET # BLD AUTO: 239 10E9/L (ref 150–450)
POTASSIUM SERPL-SCNC: 3.8 MMOL/L (ref 3.4–5.3)
RBC # BLD AUTO: 4.25 10E12/L (ref 4.4–5.9)
SODIUM SERPL-SCNC: 141 MMOL/L (ref 133–144)
WBC # BLD AUTO: 7.2 10E9/L (ref 4–11)

## 2019-01-06 PROCEDURE — 12000001 ZZH R&B MED SURG/OB UMMC

## 2019-01-06 PROCEDURE — 25000132 ZZH RX MED GY IP 250 OP 250 PS 637: Performed by: STUDENT IN AN ORGANIZED HEALTH CARE EDUCATION/TRAINING PROGRAM

## 2019-01-06 PROCEDURE — 25000128 H RX IP 250 OP 636: Performed by: STUDENT IN AN ORGANIZED HEALTH CARE EDUCATION/TRAINING PROGRAM

## 2019-01-06 PROCEDURE — 80048 BASIC METABOLIC PNL TOTAL CA: CPT | Performed by: INTERNAL MEDICINE

## 2019-01-06 PROCEDURE — 85027 COMPLETE CBC AUTOMATED: CPT | Performed by: INTERNAL MEDICINE

## 2019-01-06 PROCEDURE — 99233 SBSQ HOSP IP/OBS HIGH 50: CPT | Mod: GC | Performed by: INTERNAL MEDICINE

## 2019-01-06 PROCEDURE — 25000128 H RX IP 250 OP 636: Performed by: INTERNAL MEDICINE

## 2019-01-06 PROCEDURE — 36415 COLL VENOUS BLD VENIPUNCTURE: CPT | Performed by: INTERNAL MEDICINE

## 2019-01-06 RX ORDER — CEFDINIR 300 MG/1
300 CAPSULE ORAL 2 TIMES DAILY
Status: DISCONTINUED | OUTPATIENT
Start: 2019-01-06 | End: 2019-01-07 | Stop reason: HOSPADM

## 2019-01-06 RX ORDER — OXYCODONE HYDROCHLORIDE 5 MG/1
5 TABLET ORAL EVERY 6 HOURS PRN
Status: DISCONTINUED | OUTPATIENT
Start: 2019-01-06 | End: 2019-01-07 | Stop reason: HOSPADM

## 2019-01-06 RX ORDER — METRONIDAZOLE 500 MG/1
500 TABLET ORAL EVERY 8 HOURS SCHEDULED
Status: DISCONTINUED | OUTPATIENT
Start: 2019-01-06 | End: 2019-01-07 | Stop reason: HOSPADM

## 2019-01-06 RX ADMIN — METRONIDAZOLE 500 MG: 500 TABLET ORAL at 12:14

## 2019-01-06 RX ADMIN — ACETAMINOPHEN 650 MG: 325 TABLET, FILM COATED ORAL at 16:02

## 2019-01-06 RX ADMIN — CEFDINIR 300 MG: 300 CAPSULE ORAL at 12:13

## 2019-01-06 RX ADMIN — PIPERACILLIN SODIUM,TAZOBACTAM SODIUM 3.38 G: 3; .375 INJECTION, POWDER, FOR SOLUTION INTRAVENOUS at 04:31

## 2019-01-06 RX ADMIN — METRONIDAZOLE 500 MG: 500 TABLET ORAL at 16:02

## 2019-01-06 RX ADMIN — METRONIDAZOLE 500 MG: 500 TABLET ORAL at 22:24

## 2019-01-06 RX ADMIN — ACETAMINOPHEN 650 MG: 325 TABLET, FILM COATED ORAL at 09:27

## 2019-01-06 RX ADMIN — CEFDINIR 300 MG: 300 CAPSULE ORAL at 19:56

## 2019-01-06 RX ADMIN — TAMSULOSIN HYDROCHLORIDE 0.4 MG: 0.4 CAPSULE ORAL at 09:23

## 2019-01-06 RX ADMIN — KETOROLAC TROMETHAMINE 30 MG: 30 INJECTION, SOLUTION INTRAMUSCULAR at 04:31

## 2019-01-06 RX ADMIN — Medication 1 MG: at 22:27

## 2019-01-06 ASSESSMENT — ACTIVITIES OF DAILY LIVING (ADL)
ADLS_ACUITY_SCORE: 12
ADLS_ACUITY_SCORE: 13

## 2019-01-06 ASSESSMENT — MIFFLIN-ST. JEOR: SCORE: 1440.47

## 2019-01-06 NOTE — PLAN OF CARE
Activity: Slept between most cares  Neuro: Arouses to touch, gentle shaking. Able to make needs known.  GI/: Reports BMs today, voiding not saving.  Diet: Advanced to clears, good tolerance  Incisions/Drains: n/a  IV Access: PIV on right infusing abx, saline locked in between  Labs: WNL  Pain: Medicated with PRN toradol with good effect.  New changes this shift: Desirous to leave the hospital AMA this afternoon, changed his mind after visit from Medicine team.  Plan: Continue POC

## 2019-01-06 NOTE — PROGRESS NOTES
COLON & RECTAL SURGERY PROGRESS NOTE  01/06/2019     Subjective  NAEON. Pain well controlled; declining scheduled Tylenol, no narcotic use since yesterday AM. Tolerating clear liquid diet without n/v. Had 4 stools yesterday. No other complaints.      Objective  Temp:  [96.3  F (35.7  C)-98.1  F (36.7  C)] 97.5  F (36.4  C)  Pulse:  [66-85] 77  Resp:  [16-18] 16  BP: ()/(52-69) 93/52  SpO2:  [93 %-95 %] 94 %    General: AAOx4, NAD, laying comfortably in bed  CV: Warm, well perfused  Pulm: nonlabored breathing on room air  Abdomen: soft, non-distended, nontender throughout  Extremities: no edema, moving all spontaneously and without apparent deficit    I/O last 3 completed shifts:  In: 800 [P.O.:700; I.V.:100]  Out: -   Urine 4x  Stool 4x    Labs:  Recent Labs   Lab 01/06/19  0647 01/04/19  0653 01/02/19  2217   WBC 7.2 8.8 11.2*   HGB 12.4* 12.8* 13.7    203 178           Assessment/Plan  53 year old male with history of a TBI and recurrent diverticulitis (1-2 attacks every 2 years, most [facility] 1 [facility]), who was admitted on  for admission for recurrent complicated diverticulitis with a 2.3 x 1.4 x 2.2 cm para-sigmoid colonic abscess. Stable and improved, now with no pain and having BMs.     - Transition to po antibiotics, recommend cefpodoxime/Flagyl for a total 14-day course.  - Agree with low residue diet.  - Patient will need outpatient colonoscopy in 6-8 weeks (ordered in Discharge Navigator, discussed with primary team that the patient does definitively need this done and may need additional assistance arranging either with [facility] or the [facility] - they plan to coordinate with Care Management to get patient additional resources for managing his healthcare).  - No specific follow up with Colorectal Surgery is needed as patient is not interested in surgical intervention, but he may call our clinic for follow up if he wants to further discuss (provided info in an order in  Discharge Navigator).  - Colorectal Surgery will sign off, but please do not hesitate to contact us with questions or concerns.      Patient and plan discussed with fellow Dr. Dyer, who will discuss with attending physician Dr. Cuevas.  - - - - - - - - - - - - - - - - - -  Jovana Euceda MD  General Surgery PGY-3  See [facility] for on call information prior to paging me directly. Pager [phone].

## 2019-01-06 NOTE — PROGRESS NOTES
[facility], [place]    Progress Note - [facility] 2 Service        Date of Admission:  1/2/2019    Assessment & Plan   Grant Swift is a 53 year old male with history significant for diverticulitis admitted on 1/2/2019 with 1-2 week duration of LLQ abdominal discomfort with significant increase in pain over the last 2 days. CT abdomen w/ contrast demonstrates inflammatory changes and colonic wall thickening involving the sigmoid colon consistent with diverticulitis. Suspected rupture along the inferior margin of the sigmoid colon near the left inguinal canal with a small contained abscess measuring approximately 1.4 x 2.3 x 2.2cm. No indication for acute surgical intervention at this time. Patient expressed discontent with being in the hospital, explained rationale of monitoring if patient continues to do well on PO antibiotic regimen and with normal PO diet. Anticipate discharge to home tomorrow morning if condition continues to improve.     # Acute complicated sigmoid diverticulitis, Hinchey I  # Para-sigmoid abscess  Plan for conservative medical management with outpatient follow-up and colonoscopy in 6-8 weeks:  - Advance as tolerated to regular diet  - Transition to PO antibiotic regimen (cefpodoxime/metronidazole for 14 days)  - Pain management - IV Toradol prn AND Tylenol scheduled  - Appreciate surgery recs     # Increased urination frequency/urgency with discomfort:  UA does not suggest infectious process such as UTI or prostatitis, likely 2/2 BPH:  - Continue tamsulosin      # Poor appetite:  Reported by patient at baseline with no significant change since onset of abdominal pain:  - Consider nutrition consult once patient able to resume PO intake     # Chronic issues:  - Tobacco use: encourage cessation, offer nicotine replacement.  - Difficulty managing uncontrolled underlying diseases likely contributing to recurrent admission: possible SW/OT consult.  - Drug abuse: DSA  positive for meth and marijuana     Diet: CLD, Advance as tolerate  Fluids: None  DVT Prophylaxis: Pneumatic Compression Devices  Pierre Catheter: not present  Code Status: DNR/DNI    Disposition Plan   Expected discharge: Tomorrow, recommended to prior living arrangement once adequate pain management/ tolerating PO medications.  Entered: Margarito Stanton 01/06/2019, 11:21 AM       The patient's care was discussed with the Attending Physician, Dr. Ludin Platt.    Margarito Stanton  [facility] 2 Service  [facility], [place]  Pager: [phone]  Please see sticky note for cross cover information  ______________________________________________________________________    Interval History   FREDDIE, patient reports abdominal discomfort improving and that he is tolerating PO liquid diet well. Reports loose BM, no traces of blood.    Data reviewed today: I reviewed all medications, new labs and imaging results over the last 24 hours. I personally reviewed no images or EKG's today.    Physical Exam   Vital Signs: Temp: 97  F (36.1  C) Temp src: Axillary BP: 136/70 Pulse: 70   Resp: 16 SpO2: 95 % O2 Device: None (Room air)    Weight: 141 lbs 14.4 oz    Constitutional: Awake, alert, cooperative, no apparent distress, and appears stated age  Cardiovascular: RRR  Lungs: CTAB  GI: Normal bowel sounds, soft NT/ND. Moderate LLQ tenderness with no rebound/guarding  Skin: normal skin color, texture, turgor  Musculoskeletal: No lower extremity pitting edema  Neurologic: Awake, alert, oriented to name, place and time.      Data   Recent Labs   Lab 01/06/19  0647 01/05/19  0629 01/04/19  0653 01/02/19  2217   WBC 7.2  --  8.8 11.2*   HGB 12.4*  --  12.8* 13.7   MCV 89  --  89 89     --  203 178    139 138 139   POTASSIUM 3.8 3.7 3.8 3.5   CHLORIDE 111* 108 106 105   CO2 24 24 25 27   BUN 8 8 7 20   CR 0.99 1.04 0.99 1.01   ANIONGAP 6 7 7 7   GUILLERMO 8.0* 7.7* 8.5 8.6   GLC 83 76 82 81

## 2019-01-06 NOTE — PLAN OF CARE
"Activity: Transfers independently. Currently sleeping.   Neuros: Alert and oriented X4.  Cardiac: BP 93/52 (BP Location: Left arm)   Pulse 77   Temp 97.5  F (36.4  C) (Oral)   Resp 16   Ht 1.702 m (5' 7\")   Wt 64.4 kg (141 lb 14.4 oz)   SpO2 94%   BMI 22.22 kg/m     Respiratory: Oxygen Saturation at 96%RA.   GI/: Not passing flatus or stool. Voiding adequately.  Diet: Clear liquid diet.   Skin: Intact.  Lines: Right PIV/infusing TKO.  Incisions/Drains: No active drains or incisions.   Labs: CBC with platelets and BMP scheduled this morning.   Pain/nausea: Denying any nausea. Abdominal pain/Ketorolac every 6hrs prn.   New changes this shift: No new changes.   Plan: Continue with plan of care.       "

## 2019-01-06 NOTE — DISCHARGE SUMMARY
[facility], [place]  Discharge Summary - Medicine & Pediatrics       Date of Admission:  1/2/2019  Date of Discharge:  1/7/2019  Discharging Provider: Dr. Ludin Platt  Discharge Service: Hattie 2    Discharge Diagnoses   Acute complicated sigmoid diverticulitis     Follow-ups Needed After Discharge   Follow-up Appointments     Follow Up ([facility])      You do not need to follow up specifically with Colorectal Surgery since you are not interested in surgical intervention for your diverticulitis, but you are welcome to make an appointment in our clinic at any time if you would like to discuss anything further.     To make an appointment, please contact the Colorectal Surgery RNs (Ankur Dudley LPN, Zoraida Watson, RN or Susie Slaughter RN) at [phone].             Colonoscopy in 6-8 weeks either with [facility] Colorectal Surgery or with [facility] Colorectal Surgery.    Unresulted Labs Ordered in the Past 30 Days of this Admission     Date and Time Order Name Status Description    1/3/2019 1802 Blood culture Preliminary     1/3/2019 1802 Blood culture Preliminary       These results will be followed, but no growth to date at this time.    Hospital Course   Grant Swift was admitted on 1/2/2019 with history significant for diverticulitis and 1-2 week duration of worsening LLQ abdominal discomfort. CT abdomen w/ contrast demonstrates inflammatory changes and colonic wall thickening involving the sigmoid colon consistent with diverticulitis. Suspected rupture along the inferior margin of the sigmoid colon near the left inguinal canal with a small contained abscess measuring approximately 1.4 x 2.3 x 2.2cm.    The following problems were addressed during his hospitalization:    # Acute complicated sigmoid diverticulitis, Hinchey I  # Para-sigmoid abscess  On presentation, the patient was afebrile and hemodynamically stable with significant LLQ abdominal pain on exam. Following evaluation by  Surgery, no indication for acute surgical intervention and patient unwilling to pursue surgical treatment options. Patient was managed with NPO bowel rest and IV antibiotics followed by PO antibiotics for his acute complicated sigmoid diverticulitis (Hinchey I) and para-sigmoid colon abscess. Hospital course was uncomplicated and patient's condition improved over several days with bowel rest and administration of IV antibiotics. Patient continued to improve even after diet was advanced and transition was made to PO antibiotic regiment was made. Management to continue with 14-day course of antibiotics and follow-up colonoscopy in 6-8 weeks as an outpatient:  - NPO with maintenance fluids from 1/2/19 to 1/5/19; diet advanced to clear liquid on 1/5/19 followed by advance to regular diet on 1/6/19  - IV Zosyn from 1/2/19 to 1/6/19 with transition to PO antibiotic regimen on 1/6/19 (cefdinir/metronidazole for 14 days)     # Increased urination frequency/urgency with discomfort:  Patient presented with reports of some difficulty with urination in regards to increased frequency and urination. UA obtained not suggestive of infectious process such as UTI or prostatitis, likely 2/2 BPH:  - Tamsulosin started, recommend follow-up with PCP for further management     # Chronic issues:  - Tobacco use: encouraged cessation, offered nicotine replacement.  - Drug abuse: DSA positive for methamphetamine and marijuana    Consultations This Hospital Stay   VASCULAR ACCESS CARE ADULT IP CONSULT    Code Status   DNR/DNI       The patient was discussed with Dr. Ludin Kuhn 2 Service  [facility], [place]  Pager: [phone]  ______________________________________________________________________    Physical Exam   Vital Signs: Temp: 96  F (35.6  C) Temp src: Oral BP: 109/70 Pulse: 85   Resp: 20 SpO2: 97 % O2 Device: None (Room air)    Weight: 141 lbs 14.4 oz    Constitutional: AAOx4, NAD,  and appears stated age  Eyes: PERRL, extra ocular muscles intact, sclera clear, conjunctiva normal  ENT: Normocephalic, without obvious abnormality, atraumatic, oral pharynx with moist mucous membranes  Respiratory: No increased work of breathing, good air exchange, clear to auscultation bilaterally, no crackles or wheezing  Cardiovascular: Regular rate and rhythm, no M/G/R  GI: Normal bowel sounds, soft, non-distended, no masses palpated, no hepatosplenomegaly, mild LLQ quadrant pain on palpation without rebound tenderness  Skin: normal skin color, texture, turgor  Musculoskeletal: No lower extremity pitting edema present, there is no redness, warmth, or swelling of the joints, full range of motion noted  Neurologic: Awake, alert, oriented to name, place and time. Cranial nerves II-XII are grossly intact.  Neuropsychiatric: General: normal, calm and normal eye contact      Primary Care Physician   [facility]    Discharge Disposition   Discharged to home  Condition at discharge: Stable    Significant Results and Procedures   Most Recent 3 CBC's:  Recent Labs   Lab Test 01/06/19  0647 01/04/19  0653 01/02/19  2217   WBC 7.2 8.8 11.2*   HGB 12.4* 12.8* 13.7   MCV 89 89 89    203 178     Most Recent 3 BMP's:  Recent Labs   Lab Test 01/06/19  0647 01/05/19  0629 01/04/19  0653    139 138   POTASSIUM 3.8 3.7 3.8   CHLORIDE 111* 108 106   CO2 24 24 25   BUN 8 8 7   CR 0.99 1.04 0.99   ANIONGAP 6 7 7   GUILLERMO 8.0* 7.7* 8.5   GLC 83 76 82   ,   Results for orders placed or performed during the hospital encounter of 01/02/19   CT Abdomen Pelvis w Contrast    Narrative    Examination:  CT ABDOMEN PELVIS W CONTRAST 1/3/2019 2:17 AM     History: Diverticulitis suspected    Comparison: None available    Technique: CT of the abdomen and pelvis were obtained with IV  contrast. Sagittal and coronal reconstructions created and reviewed.    Findings:   Colonic wall thickening and inflammatory  changes in the sigmoid colon  associated with extensive diverticulitis diverticulosis. Suspected  rupture and small abscess formation along the inferior and anterior  margin of the sigmoid colon on series 4 image 34 and series 3 image  334 measuring approximately 2.3 x 1.4 x 2.2 cm. Adjacent inflammatory  changes in the mesenteric fat.  The remainder of the colon is within  normal limits, the exception of diverticulosis involving the majority  of the colon, less on the right compared to the left. Left iliac chain  adenopathy is likely reactive. The appendix is seen within a right  inguinal hernia. There is moderate thickening of the distal esophagus.      Focal fatty infiltration along the falciform ligament. The liver is  otherwise unremarkable. The spleen, pancreas, and adrenal glands are  within normal limits. Nonobstructing 3 mm left renal stone. 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 There is a focal  hypodensity in the superior cortex of the right kidney, too small to  characterize, possibly a calyceal diverticulum or a cyst. There is no  hydronephrosis. The bladder is mildly distended. Coarse prostatic  calcifications. The abdominal aorta is nondilated.     Lung bases: No focal consolidation. There is a subpleural nodule in  the right lower lobe measuring 6 mm on series 3 image 5. Bibasilar  atelectasis.    Bones and soft tissues: No acute bony abnormalities. Bilateral pars  defects at L5-S1 and anterolisthesis of L5. Prior left lower quadrant  hernia repair with mesh.      Impression    Impression:   1. Inflammatory changes and colonic wall thickening involving the  sigmoid colon consistent with diverticulitis. Suspected rupture along  the inferior margin of the sigmoid colon near the left inguinal canal  with a small contained abscess measuring approximately 1.4 x 2.3 x 2.2  cm.  2. Right lower lobe nodule measuring 6 mm.  Consider baseline CT chest  and follow up, particularly if high risk.     3. Moderate thickening of the distal esophagus and GE junction, most  compatible with reflux esophagitis.  4. Non obstructing renal calculi in the left kidney.    I have personally reviewed the examination and initial interpretation  and I agree with the findings.    HERNANDEZ SAENZ MD       Discharge Orders      GASTROENTEROLOGY ADULT REF PROCEDURE ONLY      Follow Up ([facility])    You do not need to follow up specifically with Colorectal Surgery since you are not interested in surgical intervention for your diverticulitis, but you are welcome to make an appointment in our clinic at any time if you would like to discuss anything further.     To make an appointment, please contact the Colorectal Surgery RNs (Ankur Dudley LPN, Zoraida Watson, AUREA or Susie Slaughter RN) at [phone].     Discharge Medications   Current Discharge Medication List      CONTINUE these medications which have NOT CHANGED    Details   ACETAMINOPHEN PO Take 500 mg by mouth every 4 hours as needed Takes 1-2 tabs      IBUPROFEN PO Take 200 mg by mouth every 6 hours as needed Patient takes 3-4 tabs           Allergies   Allergies   Allergen Reactions     Morphine Sulfate Itching

## 2019-01-06 NOTE — PLAN OF CARE
IV antibiotics discontinued, started on oral antibiotics this afternoon. Diet advanced to regular from clear liquids, which he is tolerating well. Denies nausea or pain after eating. PIV SL, up independently, going outside to smoke. Plan for discharge home tomorrow if stable on oral antibiotics. Mild abdominal pain, some relief with Tylenol, has new order for PRN Oxycodone if needed.  Diet order adjusted to low fiber.

## 2019-01-07 VITALS
OXYGEN SATURATION: 95 % | HEIGHT: 67 IN | SYSTOLIC BLOOD PRESSURE: 131 MMHG | WEIGHT: 141.5 LBS | RESPIRATION RATE: 18 BRPM | TEMPERATURE: 95.6 F | BODY MASS INDEX: 22.21 KG/M2 | HEART RATE: 83 BPM | DIASTOLIC BLOOD PRESSURE: 85 MMHG

## 2019-01-07 PROCEDURE — 99239 HOSP IP/OBS DSCHRG MGMT >30: CPT | Mod: GC | Performed by: INTERNAL MEDICINE

## 2019-01-07 PROCEDURE — 25000132 ZZH RX MED GY IP 250 OP 250 PS 637: Performed by: STUDENT IN AN ORGANIZED HEALTH CARE EDUCATION/TRAINING PROGRAM

## 2019-01-07 RX ORDER — METRONIDAZOLE 500 MG/1
500 TABLET ORAL EVERY 8 HOURS
Qty: 42 TABLET | Refills: 0 | Status: SHIPPED | OUTPATIENT
Start: 2019-01-07 | End: 2019-01-21

## 2019-01-07 RX ORDER — TAMSULOSIN HYDROCHLORIDE 0.4 MG/1
0.4 CAPSULE ORAL DAILY
Qty: 30 CAPSULE | Refills: 0 | Status: SHIPPED | OUTPATIENT
Start: 2019-01-07 | End: 2019-02-06

## 2019-01-07 RX ORDER — CEFDINIR 300 MG/1
300 CAPSULE ORAL 2 TIMES DAILY
Qty: 28 CAPSULE | Refills: 0 | Status: SHIPPED | OUTPATIENT
Start: 2019-01-07 | End: 2019-01-21

## 2019-01-07 RX ADMIN — CEFDINIR 300 MG: 300 CAPSULE ORAL at 08:02

## 2019-01-07 RX ADMIN — TAMSULOSIN HYDROCHLORIDE 0.4 MG: 0.4 CAPSULE ORAL at 08:02

## 2019-01-07 RX ADMIN — METRONIDAZOLE 500 MG: 500 TABLET ORAL at 06:02

## 2019-01-07 ASSESSMENT — ACTIVITIES OF DAILY LIVING (ADL)
ADLS_ACUITY_SCORE: 13

## 2019-01-07 NOTE — PROGRESS NOTES
Pt is alert and oriented up independently denies pain and nausea had good appetite.LS clear,BS+ ,reported x 2 loose BM this morning and adequate urinary output.Pt discharged home in stable condition.discharge instructions provided by charge nurse.

## 2019-01-07 NOTE — PLAN OF CARE
Activity: Up ad edenilson, ambulated outside independently  Neuro: Affect seems improved AEB more smiles seen today versus yesterday.  GI/: Reports +BM, +flatus, voiding not saving.  Diet: Low-fiber diet with good tolerance, no c/o n/v.  Incisions/Drains: n/a  IV Access: PIV on Right saline locked  Labs: none of note  Vitals: WNL, afebrile, on RA.  Pain: medicated with scheduled tylenol with good result.  New changes this shift: PO abx instead of IV, going well.  Plan: Possible discharge tomorrow, continue POC.

## 2019-01-07 NOTE — PLAN OF CARE
Neuro: alert and oriented  GI/: voiding not saving  Diet: low fiber diet  IV Access:RFA IV is saline locked  Activity:independent  Pain: denied pain  New changes this shift:none  Plan: continue with plan of care, patient is planning on going home today

## 2019-01-08 ENCOUNTER — PATIENT OUTREACH (OUTPATIENT)
Dept: CARE COORDINATION | Facility: CLINIC | Age: 54
End: 2019-01-08

## 2019-01-09 LAB
BACTERIA SPEC CULT: NO GROWTH
BACTERIA SPEC CULT: NO GROWTH
Lab: NORMAL
Lab: NORMAL
SPECIMEN SOURCE: NORMAL
SPECIMEN SOURCE: NORMAL

## 2021-05-25 ENCOUNTER — RECORDS - HEALTHEAST (OUTPATIENT)
Dept: ADMINISTRATIVE | Facility: CLINIC | Age: 56
End: 2021-05-25

## 2021-05-27 ENCOUNTER — RECORDS - HEALTHEAST (OUTPATIENT)
Dept: ADMINISTRATIVE | Facility: CLINIC | Age: 56
End: 2021-05-27

## 2021-05-28 ENCOUNTER — RECORDS - HEALTHEAST (OUTPATIENT)
Dept: ADMINISTRATIVE | Facility: CLINIC | Age: 56
End: 2021-05-28

## 2021-05-29 ENCOUNTER — RECORDS - HEALTHEAST (OUTPATIENT)
Dept: ADMINISTRATIVE | Facility: CLINIC | Age: 56
End: 2021-05-29

## 2021-06-02 ENCOUNTER — RECORDS - HEALTHEAST (OUTPATIENT)
Dept: ADMINISTRATIVE | Facility: CLINIC | Age: 56
End: 2021-06-02

## 2022-09-09 ENCOUNTER — ANCILLARY PROCEDURE (OUTPATIENT)
Dept: BONE DENSITY | Facility: CLINIC | Age: 57
End: 2022-09-09
Attending: FAMILY MEDICINE

## 2022-09-09 DIAGNOSIS — M85.80 OTHER SPECIFIED DISORDERS OF BONE DENSITY AND STRUCTURE, UNSPECIFIED SITE: ICD-10-CM

## 2022-09-09 PROCEDURE — 77080 DXA BONE DENSITY AXIAL: CPT

## 2024-05-13 ENCOUNTER — HOSPITAL ENCOUNTER (EMERGENCY)
Facility: CLINIC | Age: 59
Discharge: LEFT BEFORE DISCHARGE | End: 2024-05-13
Attending: EMERGENCY MEDICINE | Admitting: EMERGENCY MEDICINE
Payer: COMMERCIAL

## 2024-05-13 VITALS
HEART RATE: 106 BPM | RESPIRATION RATE: 20 BRPM | OXYGEN SATURATION: 95 % | DIASTOLIC BLOOD PRESSURE: 81 MMHG | SYSTOLIC BLOOD PRESSURE: 120 MMHG | TEMPERATURE: 98.2 F

## 2024-05-13 DIAGNOSIS — R10.84 GENERALIZED ABDOMINAL PAIN: ICD-10-CM

## 2024-05-13 LAB
ALBUMIN UR-MCNC: 10 MG/DL
APPEARANCE UR: CLEAR
BILIRUB UR QL STRIP: NEGATIVE
COLOR UR AUTO: YELLOW
GLUCOSE UR STRIP-MCNC: NEGATIVE MG/DL
HGB UR QL STRIP: ABNORMAL
KETONES UR STRIP-MCNC: NEGATIVE MG/DL
LEUKOCYTE ESTERASE UR QL STRIP: ABNORMAL
MUCOUS THREADS #/AREA URNS LPF: PRESENT /LPF
NITRATE UR QL: NEGATIVE
PH UR STRIP: 5.5 [PH] (ref 5–7)
RBC URINE: 1 /HPF
SP GR UR STRIP: 1.02 (ref 1–1.03)
SQUAMOUS EPITHELIAL: <1 /HPF
TRANSITIONAL EPI: <1 /HPF
UROBILINOGEN UR STRIP-MCNC: NORMAL MG/DL
WBC URINE: 3 /HPF

## 2024-05-13 PROCEDURE — 99285 EMERGENCY DEPT VISIT HI MDM: CPT | Mod: 25 | Performed by: EMERGENCY MEDICINE

## 2024-05-13 PROCEDURE — 81003 URINALYSIS AUTO W/O SCOPE: CPT | Performed by: EMERGENCY MEDICINE

## 2024-05-13 PROCEDURE — 99284 EMERGENCY DEPT VISIT MOD MDM: CPT | Performed by: EMERGENCY MEDICINE

## 2024-05-13 RX ORDER — ACETAMINOPHEN 325 MG/1
975 TABLET ORAL ONCE
Status: DISCONTINUED | OUTPATIENT
Start: 2024-05-13 | End: 2024-05-13 | Stop reason: HOSPADM

## 2024-05-13 RX ORDER — OXYCODONE HYDROCHLORIDE 5 MG/1
5 TABLET ORAL ONCE
Status: DISCONTINUED | OUTPATIENT
Start: 2024-05-13 | End: 2024-05-13 | Stop reason: HOSPADM

## 2024-05-13 RX ORDER — ONDANSETRON 2 MG/ML
4 INJECTION INTRAMUSCULAR; INTRAVENOUS ONCE
Status: DISCONTINUED | OUTPATIENT
Start: 2024-05-13 | End: 2024-05-13 | Stop reason: HOSPADM

## 2024-05-13 ASSESSMENT — COLUMBIA-SUICIDE SEVERITY RATING SCALE - C-SSRS
2. HAVE YOU ACTUALLY HAD ANY THOUGHTS OF KILLING YOURSELF IN THE PAST MONTH?: NO
6. HAVE YOU EVER DONE ANYTHING, STARTED TO DO ANYTHING, OR PREPARED TO DO ANYTHING TO END YOUR LIFE?: YES
1. IN THE PAST MONTH, HAVE YOU WISHED YOU WERE DEAD OR WISHED YOU COULD GO TO SLEEP AND NOT WAKE UP?: NO

## 2024-05-13 ASSESSMENT — ACTIVITIES OF DAILY LIVING (ADL)
ADLS_ACUITY_SCORE: 33

## 2024-05-13 NOTE — ED TRIAGE NOTES
BIBA for c/o constipation and fever. Pt c/o intermittent abdominal pain 9/10 sharp and cramping pain.  Last BM: 1 hour ago, very soft.  Recent hernia repair surgery 1 month ago.      Patient tried enema last night but it did not help.    99 blood glucose    Patient states he wants to be full code.    Pt denies CP, endorses some SOB overnight but not at present.

## 2024-05-13 NOTE — ED PROVIDER NOTES
[place] EMERGENCY DEPARTMENT ([facility])    5/13/24       ED PROVIDER NOTE        History     Chief Complaint   Patient presents with     Post-op Problem     HPI  Grant Swift is a 58 year old male who is 1 month status post right inguinal hernia repair and presents to the emergency department due to abdominal pain, fever and nausea.  He reports since his surgery he has had frequent diarrhea approximately 10 episodes of loose stools a day however, very small volume.  He has also noted right testicular pain since the time of his surgery.  [facility] and was felt to have constipation.  He was given an enema to use at home.  He reports he used an enema last night and had worsening symptoms including increasing pain.  Additionally, he reports he is febrile to 102.  He has been nauseated but denies vomiting.  This morning he did have a large bowel movement but continues to have ongoing pain.  The pain seems to be primarily in the right lower quadrant but is present throughout the abdomen.  He reports his he has been urinating but has had frequent urination and more difficult flow.  He continues to have right-sided testicular pain but has not noted any increased swelling or redness.      This part of the medical record was transcribed by JOEL VARGAS Medical Scribe, from a dictation done by Deborah Moar MD.     Past Medical History  Past Medical History:   Diagnosis Date     Depressive disorder      Head injury 2013     No past surgical history on file.  ACETAMINOPHEN PO  IBUPROFEN PO      Allergies   Allergen Reactions     Morphine Sulfate Itching     Family History  No family history on file.  Social History   Social History     Tobacco Use     Smoking status: Every Day     Current packs/day: 0.50     Average packs/day: 0.5 packs/day for 32.0 years (16.0 ttl pk-yrs)     Types: Cigarettes     Smokeless tobacco: Never     Tobacco comments:     6-10  cigarettes a day   Substance Use Topics     Alcohol use: No     Drug use: Yes     Types: Marijuana      Past medical history, past surgical history, medications, allergies, family history, and social history were reviewed with the patient. No additional pertinent items.      A complete review of systems was performed with pertinent positives and negatives noted in the HPI, and all other systems negative.    Physical Exam   BP: 120/81  Pulse: 106  Temp: 98.2  F (36.8  C)  Resp: 20  SpO2: 95 %  Physical Exam  General: patient is alert and oriented, uncomfortable appearing  Head: atraumatic and normocephalic   EENT: moist mucus membranes, sclera anicteric   Neck: supple   Cardiovascular: regular rate and rhythm, extremities warm and well perfused, no lower extremity edema  Pulmonary: lungs clear to auscultation bilaterally   Abdomen: soft, TTP in the RLQ, no guarding  : circumcised male, normal testicular lie, no redness or swelling noted  Musculoskeletal: normal range of motion   Neurological: alert and oriented, moving all extremities symmetrically, gait normal   Skin: warm, dry       ED Course, Procedures, & Data      Procedures            No results found for any visits on 05/13/24.  Medications   sodium chloride 0.9% BOLUS 1,000 mL (has no administration in time range)   oxyCODONE (ROXICODONE) tablet 5 mg (has no administration in time range)   acetaminophen (TYLENOL) tablet 975 mg (has no administration in time range)   ondansetron (ZOFRAN) injection 4 mg (has no administration in time range)     Labs Ordered and Resulted from Time of ED Arrival to Time of ED Departure - No data to display  CT Abdomen Pelvis w Contrast    (Results Pending)   US Testicular & Scrotum w Doppler Ltd    (Results Pending)          Critical care was not performed.     Medical Decision Making  The patient's presentation was of high complexity (an acute health issue posing potential threat to life or bodily function).    The patient's  evaluation involved:  ordering and/or review of 3+ test(s) in this encounter (see separate area of note for details)    The patient's management necessitated only low risk treatment.      Assessment & Plan    Mr. Swift is a 58-year-old male who is 1 month status post right inguinal hernia repair and presents to the emergency department with increasing abdominal pain, nausea and fever.  He has noted to be hemodynamically stable, afebrile and in no respiratory distress.  Differential diagnosis includes but is not limited to constipation, colitis, diverticulitis, surgical site infection, UTI, epididymitis, less consistent with testicular torsion, infectious colitis.  Laboratory studies ordered as well as an abdominal CT and testicular ultrasound however per nursing report he left the ED prior to obtaining lab draw or completing any further workup.  He did not have his IV in place left.  Unable to complete a discussion regarding leaving AGAINST MEDICAL ADVICE as patient left the ED without telling any providers.      This part of the medical record was transcribed by JOEL VARGAS Medical Scribe, from a dictation done by Deborah Pisano MD.     I have reviewed the nursing notes. I have reviewed the findings, diagnosis, plan and need for follow up with the patient.    New Prescriptions    No medications on file       Final diagnoses:   Generalized abdominal pain       Deborah Pisano MD  [facility] EMERGENCY DEPARTMENT  5/13/2024        Deborah Pisano MD  05/13/24 1524

## 2024-11-26 ENCOUNTER — APPOINTMENT (OUTPATIENT)
Dept: CT IMAGING | Facility: CLINIC | Age: 59
End: 2024-11-26
Attending: STUDENT IN AN ORGANIZED HEALTH CARE EDUCATION/TRAINING PROGRAM
Payer: COMMERCIAL

## 2024-11-26 ENCOUNTER — HOSPITAL ENCOUNTER (INPATIENT)
Facility: CLINIC | Age: 59
End: 2024-11-26
Attending: STUDENT IN AN ORGANIZED HEALTH CARE EDUCATION/TRAINING PROGRAM | Admitting: INTERNAL MEDICINE
Payer: COMMERCIAL

## 2024-11-26 DIAGNOSIS — K57.92 DIVERTICULITIS: ICD-10-CM

## 2024-11-26 DIAGNOSIS — R39.9 LOWER URINARY TRACT SYMPTOMS (LUTS): ICD-10-CM

## 2024-11-26 DIAGNOSIS — R10.31 ABDOMINAL PAIN, RIGHT LOWER QUADRANT: ICD-10-CM

## 2024-11-26 DIAGNOSIS — K21.9 GASTROESOPHAGEAL REFLUX DISEASE, UNSPECIFIED WHETHER ESOPHAGITIS PRESENT: ICD-10-CM

## 2024-11-26 DIAGNOSIS — K59.00 CONSTIPATION, UNSPECIFIED CONSTIPATION TYPE: ICD-10-CM

## 2024-11-26 DIAGNOSIS — K57.20 DIVERTICULITIS OF COLON WITH PERFORATION: ICD-10-CM

## 2024-11-26 DIAGNOSIS — R19.7 DIARRHEA, UNSPECIFIED TYPE: Primary | ICD-10-CM

## 2024-11-26 LAB
ALBUMIN SERPL BCG-MCNC: 3.9 G/DL (ref 3.5–5.2)
ALBUMIN UR-MCNC: 30 MG/DL
ALP SERPL-CCNC: 150 U/L (ref 40–150)
ALT SERPL W P-5'-P-CCNC: 35 U/L (ref 0–70)
ANION GAP SERPL CALCULATED.3IONS-SCNC: 12 MMOL/L (ref 7–15)
APPEARANCE UR: CLEAR
AST SERPL W P-5'-P-CCNC: 21 U/L (ref 0–45)
BASOPHILS # BLD AUTO: 0 10E3/UL (ref 0–0.2)
BASOPHILS NFR BLD AUTO: 0 %
BILIRUB SERPL-MCNC: 0.3 MG/DL
BILIRUB UR QL STRIP: NEGATIVE
BUN SERPL-MCNC: 19 MG/DL (ref 8–23)
CALCIUM SERPL-MCNC: 10 MG/DL (ref 8.8–10.4)
CHLORIDE SERPL-SCNC: 100 MMOL/L (ref 98–107)
COLOR UR AUTO: YELLOW
CREAT SERPL-MCNC: 1.16 MG/DL (ref 0.67–1.17)
CRP SERPL-MCNC: 137 MG/L
EGFRCR SERPLBLD CKD-EPI 2021: 73 ML/MIN/1.73M2
EOSINOPHIL # BLD AUTO: 0.1 10E3/UL (ref 0–0.7)
EOSINOPHIL NFR BLD AUTO: 1 %
ERYTHROCYTE [DISTWIDTH] IN BLOOD BY AUTOMATED COUNT: 15.7 % (ref 10–15)
GLUCOSE SERPL-MCNC: 78 MG/DL (ref 70–99)
GLUCOSE UR STRIP-MCNC: 200 MG/DL
HCO3 SERPL-SCNC: 26 MMOL/L (ref 22–29)
HCT VFR BLD AUTO: 41.5 % (ref 40–53)
HGB BLD-MCNC: 13.2 G/DL (ref 13.3–17.7)
HGB UR QL STRIP: NEGATIVE
HYALINE CASTS: 2 /LPF
IMM GRANULOCYTES # BLD: 0.1 10E3/UL
IMM GRANULOCYTES NFR BLD: 1 %
KETONES UR STRIP-MCNC: ABNORMAL MG/DL
LEUKOCYTE ESTERASE UR QL STRIP: ABNORMAL
LIPASE SERPL-CCNC: 13 U/L (ref 13–60)
LYMPHOCYTES # BLD AUTO: 1.5 10E3/UL (ref 0.8–5.3)
LYMPHOCYTES NFR BLD AUTO: 13 %
MAGNESIUM SERPL-MCNC: 2.2 MG/DL (ref 1.7–2.3)
MCH RBC QN AUTO: 24.8 PG (ref 26.5–33)
MCHC RBC AUTO-ENTMCNC: 31.8 G/DL (ref 31.5–36.5)
MCV RBC AUTO: 78 FL (ref 78–100)
MONOCYTES # BLD AUTO: 0.9 10E3/UL (ref 0–1.3)
MONOCYTES NFR BLD AUTO: 8 %
MUCOUS THREADS #/AREA URNS LPF: PRESENT /LPF
NEUTROPHILS # BLD AUTO: 8.8 10E3/UL (ref 1.6–8.3)
NEUTROPHILS NFR BLD AUTO: 77 %
NITRATE UR QL: NEGATIVE
NRBC # BLD AUTO: 0 10E3/UL
NRBC BLD AUTO-RTO: 0 /100
PH UR STRIP: 6 [PH] (ref 5–7)
PHOSPHATE SERPL-MCNC: 3.3 MG/DL (ref 2.5–4.5)
PLATELET # BLD AUTO: 457 10E3/UL (ref 150–450)
POTASSIUM SERPL-SCNC: 3.9 MMOL/L (ref 3.4–5.3)
PROT SERPL-MCNC: 7.8 G/DL (ref 6.4–8.3)
RBC # BLD AUTO: 5.32 10E6/UL (ref 4.4–5.9)
RBC URINE: 1 /HPF
SODIUM SERPL-SCNC: 138 MMOL/L (ref 135–145)
SP GR UR STRIP: 1.02 (ref 1–1.03)
SQUAMOUS EPITHELIAL: <1 /HPF
UROBILINOGEN UR STRIP-MCNC: 2 MG/DL
WBC # BLD AUTO: 11.4 10E3/UL (ref 4–11)
WBC URINE: 5 /HPF

## 2024-11-26 PROCEDURE — 96365 THER/PROPH/DIAG IV INF INIT: CPT | Mod: 59 | Performed by: STUDENT IN AN ORGANIZED HEALTH CARE EDUCATION/TRAINING PROGRAM

## 2024-11-26 PROCEDURE — 74177 CT ABD & PELVIS W/CONTRAST: CPT

## 2024-11-26 PROCEDURE — 84460 ALANINE AMINO (ALT) (SGPT): CPT | Performed by: STUDENT IN AN ORGANIZED HEALTH CARE EDUCATION/TRAINING PROGRAM

## 2024-11-26 PROCEDURE — 99223 1ST HOSP IP/OBS HIGH 75: CPT | Mod: FS | Performed by: STUDENT IN AN ORGANIZED HEALTH CARE EDUCATION/TRAINING PROGRAM

## 2024-11-26 PROCEDURE — 80053 COMPREHEN METABOLIC PANEL: CPT | Performed by: STUDENT IN AN ORGANIZED HEALTH CARE EDUCATION/TRAINING PROGRAM

## 2024-11-26 PROCEDURE — 250N000011 HC RX IP 250 OP 636: Performed by: NURSE PRACTITIONER

## 2024-11-26 PROCEDURE — 99232 SBSQ HOSP IP/OBS MODERATE 35: CPT | Mod: GC | Performed by: SURGERY

## 2024-11-26 PROCEDURE — 99285 EMERGENCY DEPT VISIT HI MDM: CPT | Mod: 25 | Performed by: STUDENT IN AN ORGANIZED HEALTH CARE EDUCATION/TRAINING PROGRAM

## 2024-11-26 PROCEDURE — 120N000002 HC R&B MED SURG/OB UMMC

## 2024-11-26 PROCEDURE — 85025 COMPLETE CBC W/AUTO DIFF WBC: CPT | Performed by: STUDENT IN AN ORGANIZED HEALTH CARE EDUCATION/TRAINING PROGRAM

## 2024-11-26 PROCEDURE — 250N000011 HC RX IP 250 OP 636: Performed by: STUDENT IN AN ORGANIZED HEALTH CARE EDUCATION/TRAINING PROGRAM

## 2024-11-26 PROCEDURE — 86140 C-REACTIVE PROTEIN: CPT | Performed by: STUDENT IN AN ORGANIZED HEALTH CARE EDUCATION/TRAINING PROGRAM

## 2024-11-26 PROCEDURE — 83690 ASSAY OF LIPASE: CPT | Performed by: STUDENT IN AN ORGANIZED HEALTH CARE EDUCATION/TRAINING PROGRAM

## 2024-11-26 PROCEDURE — 36415 COLL VENOUS BLD VENIPUNCTURE: CPT | Performed by: STUDENT IN AN ORGANIZED HEALTH CARE EDUCATION/TRAINING PROGRAM

## 2024-11-26 PROCEDURE — 83735 ASSAY OF MAGNESIUM: CPT | Performed by: NURSE PRACTITIONER

## 2024-11-26 PROCEDURE — 96376 TX/PRO/DX INJ SAME DRUG ADON: CPT | Performed by: STUDENT IN AN ORGANIZED HEALTH CARE EDUCATION/TRAINING PROGRAM

## 2024-11-26 PROCEDURE — 96375 TX/PRO/DX INJ NEW DRUG ADDON: CPT | Performed by: STUDENT IN AN ORGANIZED HEALTH CARE EDUCATION/TRAINING PROGRAM

## 2024-11-26 PROCEDURE — 99285 EMERGENCY DEPT VISIT HI MDM: CPT | Performed by: STUDENT IN AN ORGANIZED HEALTH CARE EDUCATION/TRAINING PROGRAM

## 2024-11-26 PROCEDURE — 82247 BILIRUBIN TOTAL: CPT | Performed by: STUDENT IN AN ORGANIZED HEALTH CARE EDUCATION/TRAINING PROGRAM

## 2024-11-26 PROCEDURE — 81001 URINALYSIS AUTO W/SCOPE: CPT | Performed by: STUDENT IN AN ORGANIZED HEALTH CARE EDUCATION/TRAINING PROGRAM

## 2024-11-26 PROCEDURE — 74177 CT ABD & PELVIS W/CONTRAST: CPT | Mod: 26 | Performed by: RADIOLOGY

## 2024-11-26 PROCEDURE — 87040 BLOOD CULTURE FOR BACTERIA: CPT | Performed by: STUDENT IN AN ORGANIZED HEALTH CARE EDUCATION/TRAINING PROGRAM

## 2024-11-26 PROCEDURE — 258N000003 HC RX IP 258 OP 636: Performed by: STUDENT IN AN ORGANIZED HEALTH CARE EDUCATION/TRAINING PROGRAM

## 2024-11-26 PROCEDURE — 84100 ASSAY OF PHOSPHORUS: CPT | Performed by: NURSE PRACTITIONER

## 2024-11-26 PROCEDURE — 99207 PR APP CREDIT; MD BILLING SHARED VISIT: CPT | Mod: FS | Performed by: NURSE PRACTITIONER

## 2024-11-26 RX ORDER — HYDROMORPHONE HYDROCHLORIDE 1 MG/ML
0.5 INJECTION, SOLUTION INTRAMUSCULAR; INTRAVENOUS; SUBCUTANEOUS
Status: DISCONTINUED | OUTPATIENT
Start: 2024-11-26 | End: 2024-11-29

## 2024-11-26 RX ORDER — HYDROMORPHONE HYDROCHLORIDE 1 MG/ML
0.5 INJECTION, SOLUTION INTRAMUSCULAR; INTRAVENOUS; SUBCUTANEOUS EVERY 30 MIN PRN
Status: DISCONTINUED | OUTPATIENT
Start: 2024-11-26 | End: 2024-11-26

## 2024-11-26 RX ORDER — ACETAMINOPHEN 325 MG/1
650 TABLET ORAL EVERY 4 HOURS PRN
Status: DISCONTINUED | OUTPATIENT
Start: 2024-11-26 | End: 2024-11-29

## 2024-11-26 RX ORDER — CALCIUM CARBONATE 500 MG/1
1000 TABLET, CHEWABLE ORAL 4 TIMES DAILY PRN
Status: DISCONTINUED | OUTPATIENT
Start: 2024-11-26 | End: 2024-11-29 | Stop reason: HOSPADM

## 2024-11-26 RX ORDER — ONDANSETRON 2 MG/ML
4 INJECTION INTRAMUSCULAR; INTRAVENOUS EVERY 6 HOURS PRN
Status: DISCONTINUED | OUTPATIENT
Start: 2024-11-26 | End: 2024-11-29 | Stop reason: HOSPADM

## 2024-11-26 RX ORDER — LIDOCAINE 40 MG/G
CREAM TOPICAL
Status: DISCONTINUED | OUTPATIENT
Start: 2024-11-26 | End: 2024-11-29 | Stop reason: HOSPADM

## 2024-11-26 RX ORDER — IBUPROFEN 200 MG
200 TABLET ORAL EVERY 4 HOURS PRN
COMMUNITY

## 2024-11-26 RX ORDER — ONDANSETRON 4 MG/1
4 TABLET, ORALLY DISINTEGRATING ORAL EVERY 6 HOURS PRN
Status: DISCONTINUED | OUTPATIENT
Start: 2024-11-26 | End: 2024-11-29 | Stop reason: HOSPADM

## 2024-11-26 RX ORDER — SODIUM CHLORIDE, SODIUM LACTATE, POTASSIUM CHLORIDE, CALCIUM CHLORIDE 600; 310; 30; 20 MG/100ML; MG/100ML; MG/100ML; MG/100ML
INJECTION, SOLUTION INTRAVENOUS CONTINUOUS
Status: DISCONTINUED | OUTPATIENT
Start: 2024-11-26 | End: 2024-11-27

## 2024-11-26 RX ORDER — AMOXICILLIN 250 MG
2 CAPSULE ORAL 2 TIMES DAILY PRN
Status: DISCONTINUED | OUTPATIENT
Start: 2024-11-26 | End: 2024-11-29 | Stop reason: HOSPADM

## 2024-11-26 RX ORDER — AMOXICILLIN 250 MG
1 CAPSULE ORAL 2 TIMES DAILY PRN
Status: DISCONTINUED | OUTPATIENT
Start: 2024-11-26 | End: 2024-11-29 | Stop reason: HOSPADM

## 2024-11-26 RX ORDER — ONDANSETRON 2 MG/ML
4 INJECTION INTRAMUSCULAR; INTRAVENOUS EVERY 30 MIN PRN
Status: DISCONTINUED | OUTPATIENT
Start: 2024-11-26 | End: 2024-11-26

## 2024-11-26 RX ORDER — IOPAMIDOL 755 MG/ML
81 INJECTION, SOLUTION INTRAVASCULAR ONCE
Status: COMPLETED | OUTPATIENT
Start: 2024-11-26 | End: 2024-11-26

## 2024-11-26 RX ADMIN — HYDROMORPHONE HYDROCHLORIDE 0.5 MG: 1 INJECTION, SOLUTION INTRAMUSCULAR; INTRAVENOUS; SUBCUTANEOUS at 13:12

## 2024-11-26 RX ADMIN — SODIUM CHLORIDE, POTASSIUM CHLORIDE, SODIUM LACTATE AND CALCIUM CHLORIDE: 600; 310; 30; 20 INJECTION, SOLUTION INTRAVENOUS at 20:32

## 2024-11-26 RX ADMIN — HYDROMORPHONE HYDROCHLORIDE 0.5 MG: 1 INJECTION, SOLUTION INTRAMUSCULAR; INTRAVENOUS; SUBCUTANEOUS at 16:21

## 2024-11-26 RX ADMIN — PIPERACILLIN SODIUM AND TAZOBACTAM SODIUM 3.38 G: 3; .375 INJECTION, SOLUTION INTRAVENOUS at 16:11

## 2024-11-26 RX ADMIN — ONDANSETRON 4 MG: 2 INJECTION INTRAMUSCULAR; INTRAVENOUS at 13:09

## 2024-11-26 RX ADMIN — PIPERACILLIN SODIUM AND TAZOBACTAM SODIUM 3.38 G: 3; .375 INJECTION, SOLUTION INTRAVENOUS at 22:35

## 2024-11-26 RX ADMIN — IOPAMIDOL 81 ML: 755 INJECTION, SOLUTION INTRAVENOUS at 14:31

## 2024-11-26 ASSESSMENT — COLUMBIA-SUICIDE SEVERITY RATING SCALE - C-SSRS
2. HAVE YOU ACTUALLY HAD ANY THOUGHTS OF KILLING YOURSELF IN THE PAST MONTH?: NO
1. IN THE PAST MONTH, HAVE YOU WISHED YOU WERE DEAD OR WISHED YOU COULD GO TO SLEEP AND NOT WAKE UP?: NO
6. HAVE YOU EVER DONE ANYTHING, STARTED TO DO ANYTHING, OR PREPARED TO DO ANYTHING TO END YOUR LIFE?: YES

## 2024-11-26 ASSESSMENT — ACTIVITIES OF DAILY LIVING (ADL)
ADLS_ACUITY_SCORE: 46

## 2024-11-26 NOTE — H&P
[facility]    History and Physical - Hospitalist Service, GOLD TEAM        Date of Admission:  11/26/2024    Assessment & Plan      Grant Swift is a 58 year old male with past medical history significant for recurrent diverticulitis, R inguinal hernia s/p recent repair, HLD, TBI, cannabis use, bipolar disorder, and GERD admitted to [facility] for abdominal pain and found to have sigmoid diverticulitis with perforation and abscess.       # Acute sigmoid diverticulitis with perforation and abscess   # Leukocytosis   # Abdominal pain   Reports abdominal pain ongoing for two weeks.  [facility] to [facility], started on PO abx and sent home.  Reports pain has gotten progressively worse and notes no BM/flatus in 5 days.   Pt reports about 10 lifetime episodes of diverticulosis but feels this has been the most painful.  WBC slightly elevated at 11.4.  CT AP on admission with sigmoid diverticulitis c/b contained perforation and abscess formation, with abscess measuring 2.7 x 2.7 cm, and large hiatal hernia without e/o ischemia.  General surgery consulted in the ED, recommending NPO and abx for now and will continue to follow.  Per chart review, had similar presentation in 1/2019.    - Appreciate Gen Surgery recs   - NPO (meds ok)  - Continue IV Zosyn 4.5g Q6H for now   - Trend CBC in AM   - Blood cx pending   - Pain regimen: dilaudid 0.5mg IV Q3H PRN     # Cyclical diarrhea and constipation   Reports ongoing issue since his hernia surgery 2 months ago.  Having up to 5-6 episodes of loose stools daily.  Per chart review, had similar constellation of symptoms following a hernia repair in 4/2024.  Reporting weight loss ~ 10lbs as below.    - GI referral placed     # Thrombocytosis   Likely reactive in setting of diverticulitis, recent hernia repair.  Plt 457.    - Trend in AM      # Urinary retention   # LUTS   Reports long standing slow flow and  "urinary frequency.  - Discussed starting tamsulosin, will order for AM, would hold if needing surgery   - Urology referral placed      # Concern for protein calorie malnutrition   Reports 10lb weight loss over the last 2 months in setting of above symptoms.  Reports poor PO intake for the last two months and essentially no PO intake in the last 5 days.   - Nutrition consult when no longer NPO   - Check Mag and Phos      # Large hiatal hernia   # Recent R hernia repair (9/2024, 4/2024)  Large hiatal hernia noted on CT, worse since 2019.      # HLD   - Noted in charting, not currently on PTA meds     # Benign pulmonary nodule   Noted on CT.  Stable 3mm RML nodule noted, unchanged since 2019.  Asymptomatic.     # Hx TBI (2013)   - On medical marijuana PTA     # Bipolar disorder   - Noted in charting, not currently on PTA meds      # GERD   - Noted in charting, not currently on PTA meds                Diet: NPO per Anesthesia Guidelines for Procedure/Surgery Except for: Meds    DVT Prophylaxis: Pneumatic Compression Devices  Pierre Catheter: Not present  Lines: None     Cardiac Monitoring: None  Code Status:  FULL     Clinically Significant Risk Factors Present on Admission                                        Disposition Plan     Medically Ready for Discharge: Anticipated in 2-4 Days         The patient's care was discussed with the Attending Physician, Dr. Kodi Whitley .    Deborah Wyatt, [facility]  Hospitalist Service, [facility]  Securely message with [employer] (more info)  Text page via [facility] Paging/Directory   See signed in provider for up to date coverage information    ______________________________________________________________________    Chief Complaint   \"Pain has gotten so much worse over the last 5 days\"     History is obtained from the patient and chart review.     History of Present Illness   Grant MYA Swift is a 58 year old male with past medical " history significant for recurrent diverticulitis, R inguinal hernia s/p recent repair, HLD, TBI, cannabis use, bipolar disorder, and GERD admitted to [facility] for abdominal pain and found to have sigmoid diverticulitis with perforation and abscess.       Grant is seen in the ED.  He reports ongoing abdominal pain for the last 2 weeks, acutely worse today prompting him to come to the ED.  [facility] and imaging showed diverticulitis.  Started on abx but felt they did not help.  He reports being unable to eat for the last 5 days, and nutrition has been overall poor for the last 2 months.  He reports ongoing issues with recurrent hiatal hernia and had surgery about 2 months ago as well.  Also reports chronic diarrhea following his hernia surgery and having loose bowel movements about 5-6 times per day.  Notes having about a 10 lb weight loss as well.  Denies bowel movements or ability to pass flatus for the last 5 days.  Also reports chronic urinary frequency and slow flow. Denies chest pain, dyspnea, nausea, and vomiting.        Past Medical History    Past Medical History:   Diagnosis Date    Depressive disorder     Head injury 2013       Past Surgical History   History reviewed. No pertinent surgical history.    Prior to Admission Medications   Prior to Admission Medications   Prescriptions Last Dose Informant Patient Reported? Taking?   ACETAMINOPHEN PO  Other Yes No   Sig: Take 500 mg by mouth every 4 hours as needed Takes 1-2 tabs   IBUPROFEN PO  Other Yes No   Sig: Take 200 mg by mouth every 6 hours as needed Patient takes 3-4 tabs      Facility-Administered Medications: None        Review of Systems    The 10 point Review of Systems is negative other than noted in the HPI or here.      Physical Exam   Vital Signs: Temp: 98.1  F (36.7  C) Temp src: Oral BP: 112/75 Pulse: 88   Resp: 18 SpO2: 98 % O2 Device: None (Room air)    Weight: 132 lbs 1.6 oz    GENERAL: Alert and oriented x 3.  Thin body habitus.  Appears undernourished.  No acute distress.    HEENT: Normocephalic, atraumatic. Anicteric sclera. Mucous membranes moist.   CV: RRR. S1, S2. No murmurs appreciated.   RESPIRATORY: Effort normal on room air. Lungs CTAB with no wheezing, rales, or rhonchi.   GI: Abdomen soft and non distended, bowel sounds present x all 4 quadrants. No tenderness, rebound, or guarding.   NEUROLOGICAL: No focal deficits. Follows commands.  Strength equal in upper and lower extremities.   MUSCULOSKELETAL: No joint swelling or tenderness. Moves all extremities.   EXTREMITIES: No gross deformities. No peripheral edema.   SKIN: Grossly warm, dry, and intact. No jaundice. No rashes.       Medical Decision Making       60 MINUTES SPENT BY ME on the date of service doing chart review, history, exam, documentation & further activities per the note.      Data     I have personally reviewed the following data over the past 24 hrs:    11.4 (H)  \   13.2 (L)   / 457 (H)     138 100 19.0 /  78   3.9 26 1.16 \     ALT: 35 AST: 21 AP: 150 TBILI: 0.3   ALB: 3.9 TOT PROTEIN: 7.8 LIPASE: 13       Imaging results reviewed over the past 24 hrs:   Recent Results (from the past 24 hours)   CT Abdomen Pelvis w Contrast    Narrative    EXAMINATION: CT ABDOMEN PELVIS W CONTRAST, 11/26/2024 2:43 PM    TECHNIQUE:  Helical CT images from the lung bases through the  symphysis pubis were obtained with IV contrast. Contrast dose: 81 ml  Isovue 370    COMPARISON: 1/3/2019    HISTORY: LLQ, suprapubic abdominal pain    FINDINGS:    Abdomen and pelvis:   Focal fatty infiltration along the falciform. No other focal hepatic  lesion. No intrahepatic or extrahepatic periductal dilatation. Normal  appearance of the gallbladder, spleen, pancreas, adrenal glands.  Symmetric enhancement of the kidneys without evidence of  hydronephrosis or nephrolithiasis. Mildly thickened appearance of the  bladder, likely reactive to findings described below. Coarse  prostatic  calcifications.    Evidence of sigmoid diverticulitis with contained perforation. There  is marked wall thickening of the sigmoid colon without evidence of  contained perforation and a 2.7 x 2.7 cm abscess containing a small  focus of gas (series 4, image 283). No dispersed free intraperitoneal  air. Along the sigmoid colon with mild associated pericolonic fat  stranding. Small bowel is normal in caliber. No bowel obstruction.  Normal appendix. Large hiatal hernia, worsened since 2019.    No abnormally enlarged lymph nodes in the abdomen or pelvis. Normal  caliber abdominal aorta. Major branches of the abdominal aorta are  patent. Mild scattered atherosclerotic changes. Portal vein is patent.    Lung bases: Stable 3 mm nodule in the right middle lobe (series 4,  image 14) - given this is stable since 2019, no follow-up necessary.  Previously seen central air nodule in the right lower lobe is resolved  since 2014. Remaining visualized lungs are clear. No effusion.    Bones and soft tissues: Chronic bilateral pars defects at L5 with  grade 1 anterolisthesis of L5 on S1. No acute or suspicious osseous  lesion. Sclerotic foci in the right femoral head and superior pubic  ramus likely representing benign bone islands. Bilateral inguinal  herniorrhaphy changes, new on the right since 1/3/2019.      Impression    IMPRESSION:   1. Sigmoid diverticulitis complicated by contained perforation and  abscess formation. The abscess measures 2.7 x 2.7 cm.  2. Large hiatal hernia, worsened/new since 2019. No evidence of  ischemia.    Findings were communicated to Dr. Orta from the ED by Dr. Ware in  radiology at 3:02 PM on 11/26/2024.    I have personally reviewed the examination and initial interpretation  and I agree with the findings.    VENKATESH POOLE,          SYSTEM ID:  [id number]

## 2024-11-26 NOTE — CONSULTS
EGS Surgery Consult  2024    Grant Swift  : 1965    Date of Service: 2024 3:20 PM    Assessment and Plan:  Grant Swift is a 58 year old male with a history of diverticulitis, R inguinal hernia s/p laparoscopic repair 2 months ago complicated by recurrence, hyperlipidemia, cannabis dependence, bipolar disorder, GERD, known history of multiple episodes of diverticulitis who presents to the emergency department with complicated sigmoid diverticulitis with intraabdominal abscess 2.7 cm.    Mildly tachycardic, high white count 11.4, abdomen mildly tender in LLQ.    Plan:  No urgent indication for surgery given patient is hemodynamically stable, abdominal exam nonperitonitic.  Admit to medicine  IV antibiotics  N.p.o., bowel rest, IV fluid resuscitation  Labs in the morning-CBC, BMP  General Surgery will continue to follow    Discussed with chief resident who will discuss with Dr. Burt Colby, MBBS  PGY-2 General Surgery Resident    History of Present Illness:    Grant Swift is a 58 year old male with a history of diverticulitis, R inguinal hernia s/p laparoscopic repair 2 months ago complicated by recurrence, hyperlipidemia, cannabis dependence, bipolar disorder, GERD, known history of multiple episodes of diverticulitis who presents to the emergency department with abdominal pain.    Patient has known diverticulosis and has had around 10 episodes of diverticulitis needing hospital admissions in the past.  But notes that diverticulitis has been bothering him more over the last few months, especially for the last couple of months.  He has been complaining of crampy abdominal pain for the last 2 months which was initially mild and he thought it might be a nonspecific stomachache, but for the past 2 weeks it has been severe.  It is all over the abdomen, but mostly in the left lower abdomen.    He has also been having loose stools for the past 2 months (6-8 stools per  day, no blood in stools), increased flatulence.  But for the past week, he has not had any bowel movement.  No nausea or vomiting, still passing some gas but less than before.  No fever or night sweats, has been complaining of some occasional chills for the past week.    He recently [facility] and he reports that all of the symptoms of diverticulitis started after that surgery.  [facility] also [facility] due his ongoing abdominal discomfort and his concern for hernia recurrence, but per patient received conflicting reports from [facility] on whether he has a recurrent inguinal hernia or not.  He was also discharged on a short course of oral Augmentin for diverticulitis at the time.  Patient expresses his frustration from recurrent hernia standpoint.    Past Medical History:  Past Medical History:   Diagnosis Date    Depressive disorder     Head injury 2013       Past Surgical History  Right laparoscopic inguinal [facility]  Left open inguinal hernia repair in the past    Social History:  Social History     Socioeconomic History    Marital status:      Spouse name: Not on file    Number of children: Not on file    Years of education: Not on file    Highest education level: Not on file   Occupational History    Not on file   Tobacco Use    Smoking status: Every Day     Current packs/day: 0.50     Average packs/day: 0.5 packs/day for 32.0 years (16.0 ttl pk-yrs)     Types: Cigarettes    Smokeless tobacco: Never    Tobacco comments:     6-10 cigarettes a day   Substance and Sexual Activity    Alcohol use: No    Drug use: Yes     Types: Marijuana    Sexual activity: Not on file   Other Topics Concern    Parent/sibling w/ CABG, MI or angioplasty before 65F 55M? Not Asked   Social History Narrative    Not on file     Social Drivers of Health     Financial Resource Strain: Not on file   Food Insecurity: Not on file  "  Transportation Needs: Not on file   Physical Activity: Not on file   Stress: Not on file   Social Connections: Not on file   Interpersonal Safety: Not on file   Housing Stability: Not on file       Medications:  Current Outpatient Medications   Medication Sig Dispense Refill    ACETAMINOPHEN PO Take 500 mg by mouth every 4 hours as needed Takes 1-2 tabs      IBUPROFEN PO Take 200 mg by mouth every 6 hours as needed Patient takes 3-4 tabs         Allergies:     Allergies   Allergen Reactions    Fish-Derived Products Nausea and Vomiting    Morphine Sulfate Itching    Mushroom Swelling     Physical Exam:    Blood pressure (!) 139/90, pulse 108, temperature 97.6  F (36.4  C), temperature source Oral, resp. rate 24, height 1.702 m (5' 7\"), weight 59.9 kg (132 lb 1.6 oz), SpO2 95%.  Gen:    Lying in bed, lean appearing, A&OX3, feels frustrated, affect appropriate given the situation  HEENT: Normocephalic and atraumatic  CV:  RRR  Pulm:  Non-labored breathing on room air  Abd:  Soft, non-distended, mildly tender in left lower quadrant of abdomen, well-healing laparoscopic scars in the abdomen, old scar from left inguinal hernia repair.  Right inguinal hernia palpable on cough impulse.  Ext:  Warm and well perfused, no obvious deformities    Labs:  CBC RESULTS:   Recent Labs   Lab Test 11/26/24  1303   WBC 11.4*   RBC 5.32   HGB 13.2*   HCT 41.5   MCV 78   MCH 24.8*   MCHC 31.8   RDW 15.7*   *     BMP RESULTS:   Recent Labs   Lab 11/26/24  1303      POTASSIUM 3.9   CHLORIDE 100   CO2 26   BUN 19.0   CR 1.16   GLC 78     LFT RESULTS:   Recent Labs   Lab 11/26/24  1303   AST 21   ALT 35   ALKPHOS 150   BILITOTAL 0.3   ALBUMIN 3.9       Imaging:  CTAP with iv contrast  IMPRESSION:   1. Sigmoid diverticulitis complicated by contained perforation and  abscess formation. The abscess measures 2.7 x 2.7 cm.  2. Large hiatal hernia, worsened/new since 2019. No evidence of  ischemia.     Findings were communicated to " Dr. Orta from the ED by Dr. Ware in  radiology at 3:02 PM on 11/26/2024.

## 2024-11-26 NOTE — ED PROVIDER NOTES
ED Provider Note  [facility]      History     Chief Complaint   Patient presents with    Abdominal Pain     HPI  Grant Swift is a 58 year old male with a history of diverticulitis, R inguinal hernia s/p previous repair, hyperlipidemia, cannabis dependence, bipolar disorder, GERD who presents to the emergency department with abdominal pain.      Patient reports that [facility] the setting of [facility] pain, as well as right inguinal pain over the last couple of days.  CT done at that time demonstrated findings concerning for uncomplicated diverticulitis as well as a recurrent right inguinal hernia without evidence of acute complication.  Notes that he was started on antibiotics at that time as well as oxycodone, and has not had a bowel movement for the last 5 days ever since.  He has been noting increasing pain in his lower quadrant.  No fever or chills.  Does note that after his inguinal hernia repair about a month ago he was having 5 episodes of diarrheal stools daily which acutely changed during his most recent hospital evaluation.   No nausea or vomiting  Past Medical History  Past Medical History:   Diagnosis Date    Depressive disorder     Head injury 2013     History reviewed. No pertinent surgical history.  ACETAMINOPHEN PO  IBUPROFEN PO      Allergies   Allergen Reactions    Fish-Derived Products Nausea and Vomiting    Morphine Sulfate Itching    Mushroom Swelling     Family History  History reviewed. No pertinent family history.  Social History   Social History     Tobacco Use    Smoking status: Every Day     Current packs/day: 0.50     Average packs/day: 0.5 packs/day for 32.0 years (16.0 ttl pk-yrs)     Types: Cigarettes    Smokeless tobacco: Never    Tobacco comments:     6-10 cigarettes a day   Substance Use Topics    Alcohol use: No    Drug use: Yes     Types: Marijuana      A medically appropriate review of systems was performed with  "pertinent positives and negatives noted in the HPI, and all other systems negative.    Physical Exam   BP: (!) 139/90  Pulse: 108  Temp: 97.6  F (36.4  C)  Resp: 24  Height: 170.2 cm (5' 7\")  Weight: 59.9 kg (132 lb 1.6 oz)  SpO2: 95 %  Physical Exam  GEN: Well appearing, non toxic, cooperative  HEENT: normocephalic and atraumatic, PERRLA, EOMI  CV: well-perfused, normal skin color for ethnicity, sinus tachycardia  PULM: breathing comfortably, in no respiratory distress, clear to auscultation upper lower lung fields  ABD: nondistended, soft, moderate tenderness to palpation all 4 quadrants, minimal suprapubic tenderness, mild right lower quadrant abdominal tenderness, appreciable inguinal hernia defect in the right without any evidence of any incarcerated hernia contents  EXT: Full range of motion.  No edema.  NEURO: awake, conversant, grossly normal bilateral upper and lower extremity strength & ROM   SKIN: No rashes, ecchymosis, or lacerations  PSYCH: Calm and cooperative, interactive    ED Course, Procedures, & Data      Procedures          Results for orders placed or performed during the hospital encounter of 11/26/24   CT Abdomen Pelvis w Contrast     Status: None    Narrative    EXAMINATION: CT ABDOMEN PELVIS W CONTRAST, 11/26/2024 2:43 PM    TECHNIQUE:  Helical CT images from the lung bases through the  symphysis pubis were obtained with IV contrast. Contrast dose: 81 ml  Isovue 370    COMPARISON: 1/3/2019    HISTORY: LLQ, suprapubic abdominal pain    FINDINGS:    Abdomen and pelvis:   Focal fatty infiltration along the falciform. No other focal hepatic  lesion. No intrahepatic or extrahepatic periductal dilatation. Normal  appearance of the gallbladder, spleen, pancreas, adrenal glands.  Symmetric enhancement of the kidneys without evidence of  hydronephrosis or nephrolithiasis. Mildly thickened appearance of the  bladder, likely reactive to findings described below. Coarse " prostatic  calcifications.    Evidence of sigmoid diverticulitis with contained perforation. There  is marked wall thickening of the sigmoid colon without evidence of  contained perforation and a 2.7 x 2.7 cm abscess containing a small  focus of gas (series 4, image 283). No dispersed free intraperitoneal  air. Along the sigmoid colon with mild associated pericolonic fat  stranding. Small bowel is normal in caliber. No bowel obstruction.  Normal appendix. Large hiatal hernia, worsened since 2019.    No abnormally enlarged lymph nodes in the abdomen or pelvis. Normal  caliber abdominal aorta. Major branches of the abdominal aorta are  patent. Mild scattered atherosclerotic changes. Portal vein is patent.    Lung bases: Stable 3 mm nodule in the right middle lobe (series 4,  image 14) - given this is stable since 2019, no follow-up necessary.  Previously seen central air nodule in the right lower lobe is resolved  since 2014. Remaining visualized lungs are clear. No effusion.    Bones and soft tissues: Chronic bilateral pars defects at L5 with  grade 1 anterolisthesis of L5 on S1. No acute or suspicious osseous  lesion. Sclerotic foci in the right femoral head and superior pubic  ramus likely representing benign bone islands. Bilateral inguinal  herniorrhaphy changes, new on the right since 1/3/2019.      Impression    IMPRESSION:   1. Sigmoid diverticulitis complicated by contained perforation and  abscess formation. The abscess measures 2.7 x 2.7 cm.  2. Large hiatal hernia, worsened/new since 2019. No evidence of  ischemia.    Findings were communicated to Dr. Orta from the ED by Dr. Ware in  radiology at 3:02 PM on 11/26/2024.    I have personally reviewed the examination and initial interpretation  and I agree with the findings.    VENKATESH POOLE,          SYSTEM ID:  [id number]   Comprehensive metabolic panel     Status: Normal   Result Value Ref Range    Sodium 138 135 - 145 mmol/L    Potassium 3.9 3.4 -  5.3 mmol/L    Carbon Dioxide (CO2) 26 22 - 29 mmol/L    Anion Gap 12 7 - 15 mmol/L    Urea Nitrogen 19.0 8.0 - 23.0 mg/dL    Creatinine 1.16 0.67 - 1.17 mg/dL    GFR Estimate 73 >60 mL/min/1.73m2    Calcium 10.0 8.8 - 10.4 mg/dL    Chloride 100 98 - 107 mmol/L    Glucose 78 70 - 99 mg/dL    Alkaline Phosphatase 150 40 - 150 U/L    AST 21 0 - 45 U/L    ALT 35 0 - 70 U/L    Protein Total 7.8 6.4 - 8.3 g/dL    Albumin 3.9 3.5 - 5.2 g/dL    Bilirubin Total 0.3 <=1.2 mg/dL   Lipase     Status: Normal   Result Value Ref Range    Lipase 13 13 - 60 U/L   UA with Microscopic reflex to Culture     Status: Abnormal    Specimen: Urine, Midstream   Result Value Ref Range    Color Urine Yellow Colorless, Straw, Light Yellow, Yellow    Appearance Urine Clear Clear    Glucose Urine 200 (A) Negative mg/dL    Bilirubin Urine Negative Negative    Ketones Urine Trace (A) Negative mg/dL    Specific Gravity Urine 1.025 1.003 - 1.035    Blood Urine Negative Negative    pH Urine 6.0 5.0 - 7.0    Protein Albumin Urine 30 (A) Negative mg/dL    Urobilinogen Urine 2.0 Normal, 2.0 mg/dL    Nitrite Urine Negative Negative    Leukocyte Esterase Urine Trace (A) Negative    Mucus Urine Present (A) None Seen /LPF    RBC Urine 1 <=2 /HPF    WBC Urine 5 <=5 /HPF    Squamous Epithelials Urine <1 <=1 /HPF    Hyaline Casts Urine 2 <=2 /LPF    Narrative    Urine Culture not indicated   CBC with platelets and differential     Status: Abnormal   Result Value Ref Range    WBC Count 11.4 (H) 4.0 - 11.0 10e3/uL    RBC Count 5.32 4.40 - 5.90 10e6/uL    Hemoglobin 13.2 (L) 13.3 - 17.7 g/dL    Hematocrit 41.5 40.0 - 53.0 %    MCV 78 78 - 100 fL    MCH 24.8 (L) 26.5 - 33.0 pg    MCHC 31.8 31.5 - 36.5 g/dL    RDW 15.7 (H) 10.0 - 15.0 %    Platelet Count 457 (H) 150 - 450 10e3/uL    % Neutrophils 77 %    % Lymphocytes 13 %    % Monocytes 8 %    % Eosinophils 1 %    % Basophils 0 %    % Immature Granulocytes 1 %    NRBCs per 100 WBC 0 <1 /100    Absolute Neutrophils  8.8 (H) 1.6 - 8.3 10e3/uL    Absolute Lymphocytes 1.5 0.8 - 5.3 10e3/uL    Absolute Monocytes 0.9 0.0 - 1.3 10e3/uL    Absolute Eosinophils 0.1 0.0 - 0.7 10e3/uL    Absolute Basophils 0.0 0.0 - 0.2 10e3/uL    Absolute Immature Granulocytes 0.1 <=0.4 10e3/uL    Absolute NRBCs 0.0 10e3/uL   CBC with platelets differential     Status: Abnormal    Narrative    The following orders were created for panel order CBC with platelets differential.  Procedure                               Abnormality         Status                     ---------                               -----------         ------                     CBC with platelets and d...[[id number]]  Abnormal            Final result                 Please view results for these tests on the individual orders.     Medications   ondansetron (ZOFRAN) injection 4 mg (4 mg Intravenous $Given 11/26/24 1309)   HYDROmorphone (PF) (DILAUDID) injection 0.5 mg (0.5 mg Intravenous $Given 11/26/24 1312)   piperacillin-tazobactam (ZOSYN) intermittent infusion 3.375 g (has no administration in time range)   pharmacy alert - intermittent dosing (has no administration in time range)   iopamidol (ISOVUE-370) solution 81 mL (81 mLs Intravenous $Given 11/26/24 1431)   sodium chloride (PF) 0.9% PF flush 70 mL (70 mLs Intravenous $Given 11/26/24 1432)     Labs Ordered and Resulted from Time of ED Arrival to Time of ED Departure   ROUTINE UA WITH MICROSCOPIC REFLEX TO CULTURE - Abnormal       Result Value    Color Urine Yellow      Appearance Urine Clear      Glucose Urine 200 (*)     Bilirubin Urine Negative      Ketones Urine Trace (*)     Specific Gravity Urine 1.025      Blood Urine Negative      pH Urine 6.0      Protein Albumin Urine 30 (*)     Urobilinogen Urine 2.0      Nitrite Urine Negative      Leukocyte Esterase Urine Trace (*)     Mucus Urine Present (*)     RBC Urine 1      WBC Urine 5      Squamous Epithelials Urine <1      Hyaline Casts Urine 2     CBC WITH PLATELETS AND  DIFFERENTIAL - Abnormal    WBC Count 11.4 (*)     RBC Count 5.32      Hemoglobin 13.2 (*)     Hematocrit 41.5      MCV 78      MCH 24.8 (*)     MCHC 31.8      RDW 15.7 (*)     Platelet Count 457 (*)     % Neutrophils 77      % Lymphocytes 13      % Monocytes 8      % Eosinophils 1      % Basophils 0      % Immature Granulocytes 1      NRBCs per 100 WBC 0      Absolute Neutrophils 8.8 (*)     Absolute Lymphocytes 1.5      Absolute Monocytes 0.9      Absolute Eosinophils 0.1      Absolute Basophils 0.0      Absolute Immature Granulocytes 0.1      Absolute NRBCs 0.0     COMPREHENSIVE METABOLIC PANEL - Normal    Sodium 138      Potassium 3.9      Carbon Dioxide (CO2) 26      Anion Gap 12      Urea Nitrogen 19.0      Creatinine 1.16      GFR Estimate 73      Calcium 10.0      Chloride 100      Glucose 78      Alkaline Phosphatase 150      AST 21      ALT 35      Protein Total 7.8      Albumin 3.9      Bilirubin Total 0.3     LIPASE - Normal    Lipase 13     BLOOD CULTURE     CT Abdomen Pelvis w Contrast   Final Result   IMPRESSION:    1. Sigmoid diverticulitis complicated by contained perforation and   abscess formation. The abscess measures 2.7 x 2.7 cm.   2. Large hiatal hernia, worsened/new since 2019. No evidence of   ischemia.      Findings were communicated to Dr. Orta from the ED by Dr. Ware in   radiology at 3:02 PM on 11/26/2024.      I have personally reviewed the examination and initial interpretation   and I agree with the findings.      VENKATESH POOLE             SYSTEM ID:  [id number]             Critical care was not performed.     Medical Decision Making  The patient's presentation was of high complexity (a chronic illness severe exacerbation, progression, or side effect of treatment).    The patient's evaluation involved:  review of external note(s) from 3+ sources (see separate area of note for details)  review of 3+ test result(s) ordered prior to this encounter (see separate area of note for  "details)  ordering and/or review of 3+ test(s) in this encounter (see separate area of note for details)    The patient's management necessitated moderate risk (IV contrast administration) and high risk (a parenteral controlled substance).    Assessment & Plan    58-year-old male with past medical history of diverticulitis, right medial hernia s/p repair 1 month ago, bipolar disorder presents emergency department due to abdominal pain associated with constipation for the last 5 days, having recently been diagnosed with diverticulitis and being started on antibiotics as well as oxycodone.    With tenderness in his left lower quadrant, either concerning for ongoing diverticulitis \"complications related to this.  Other possibilities would be a constipation in the setting of his oxycodone.  Lower suspicion at this time based on clinical appearance of a perforation.  Consider nephrolithiasis although lower suspicion as well.  CT scan ordered.  Will plan to give antiemetics as well as Dilaudid as he gets itchiness with morphine.    3:00 PM call from radiology about a concern for diverticulitis with abscess and contained perforation.  Zosyn ordered, general surgery consult ordered    5:15 PM.  They would request that he be n.p.o., admitted to medicine, with ongoing antibiotics and they will follow    I have reviewed the nursing notes. I have reviewed the findings, diagnosis, plan and need for follow up with the patient.    New Prescriptions    No medications on file       Final diagnoses:   Diverticulitis of colon with perforation       Dana Orta MD  [facility] EMERGENCY DEPARTMENT  11/26/2024     Dana Orta MD  11/26/24 1715    "

## 2024-11-26 NOTE — ED TRIAGE NOTES
[facility], had hernia rx 2 months ago and has been told that repair is still intact and that repair has failed. Pt experiencing frequent loose stools since surgery, hasn't had stool tested, but last five days hasn't had a regular bowel movement.

## 2024-11-27 LAB
ALBUMIN SERPL BCG-MCNC: 3.1 G/DL (ref 3.5–5.2)
ALP SERPL-CCNC: 119 U/L (ref 40–150)
ALT SERPL W P-5'-P-CCNC: 20 U/L (ref 0–70)
ANION GAP SERPL CALCULATED.3IONS-SCNC: 10 MMOL/L (ref 7–15)
AST SERPL W P-5'-P-CCNC: 15 U/L (ref 0–45)
BILIRUB SERPL-MCNC: 0.4 MG/DL
BUN SERPL-MCNC: 16.2 MG/DL (ref 8–23)
CALCIUM SERPL-MCNC: 9.2 MG/DL (ref 8.8–10.4)
CHLORIDE SERPL-SCNC: 101 MMOL/L (ref 98–107)
CREAT SERPL-MCNC: 1.23 MG/DL (ref 0.67–1.17)
CRP SERPL-MCNC: 105 MG/L
EGFRCR SERPLBLD CKD-EPI 2021: 68 ML/MIN/1.73M2
ERYTHROCYTE [DISTWIDTH] IN BLOOD BY AUTOMATED COUNT: 15.8 % (ref 10–15)
GLUCOSE SERPL-MCNC: 88 MG/DL (ref 70–99)
HCO3 SERPL-SCNC: 25 MMOL/L (ref 22–29)
HCT VFR BLD AUTO: 36.5 % (ref 40–53)
HGB BLD-MCNC: 11.5 G/DL (ref 13.3–17.7)
MCH RBC QN AUTO: 24.7 PG (ref 26.5–33)
MCHC RBC AUTO-ENTMCNC: 31.5 G/DL (ref 31.5–36.5)
MCV RBC AUTO: 78 FL (ref 78–100)
PLATELET # BLD AUTO: 373 10E3/UL (ref 150–450)
POTASSIUM SERPL-SCNC: 4.1 MMOL/L (ref 3.4–5.3)
PROT SERPL-MCNC: 6.2 G/DL (ref 6.4–8.3)
RBC # BLD AUTO: 4.66 10E6/UL (ref 4.4–5.9)
SODIUM SERPL-SCNC: 136 MMOL/L (ref 135–145)
WBC # BLD AUTO: 10.3 10E3/UL (ref 4–11)

## 2024-11-27 PROCEDURE — 99233 SBSQ HOSP IP/OBS HIGH 50: CPT

## 2024-11-27 PROCEDURE — 250N000013 HC RX MED GY IP 250 OP 250 PS 637: Performed by: NURSE PRACTITIONER

## 2024-11-27 PROCEDURE — 86140 C-REACTIVE PROTEIN: CPT | Performed by: STUDENT IN AN ORGANIZED HEALTH CARE EDUCATION/TRAINING PROGRAM

## 2024-11-27 PROCEDURE — 250N000011 HC RX IP 250 OP 636: Performed by: NURSE PRACTITIONER

## 2024-11-27 PROCEDURE — 258N000003 HC RX IP 258 OP 636: Performed by: STUDENT IN AN ORGANIZED HEALTH CARE EDUCATION/TRAINING PROGRAM

## 2024-11-27 PROCEDURE — 85041 AUTOMATED RBC COUNT: CPT | Performed by: NURSE PRACTITIONER

## 2024-11-27 PROCEDURE — 36415 COLL VENOUS BLD VENIPUNCTURE: CPT | Performed by: STUDENT IN AN ORGANIZED HEALTH CARE EDUCATION/TRAINING PROGRAM

## 2024-11-27 PROCEDURE — 250N000013 HC RX MED GY IP 250 OP 250 PS 637

## 2024-11-27 PROCEDURE — 85018 HEMOGLOBIN: CPT | Performed by: NURSE PRACTITIONER

## 2024-11-27 PROCEDURE — 84155 ASSAY OF PROTEIN SERUM: CPT | Performed by: NURSE PRACTITIONER

## 2024-11-27 PROCEDURE — 258N000003 HC RX IP 258 OP 636

## 2024-11-27 PROCEDURE — 82565 ASSAY OF CREATININE: CPT | Performed by: NURSE PRACTITIONER

## 2024-11-27 PROCEDURE — 84460 ALANINE AMINO (ALT) (SGPT): CPT | Performed by: NURSE PRACTITIONER

## 2024-11-27 PROCEDURE — 120N000002 HC R&B MED SURG/OB UMMC

## 2024-11-27 PROCEDURE — 250N000011 HC RX IP 250 OP 636

## 2024-11-27 RX ORDER — TAMSULOSIN HYDROCHLORIDE 0.4 MG/1
0.4 CAPSULE ORAL DAILY
Status: DISCONTINUED | OUTPATIENT
Start: 2024-11-27 | End: 2024-11-29 | Stop reason: HOSPADM

## 2024-11-27 RX ORDER — SODIUM CHLORIDE, SODIUM LACTATE, POTASSIUM CHLORIDE, CALCIUM CHLORIDE 600; 310; 30; 20 MG/100ML; MG/100ML; MG/100ML; MG/100ML
INJECTION, SOLUTION INTRAVENOUS CONTINUOUS
Status: DISCONTINUED | OUTPATIENT
Start: 2024-11-27 | End: 2024-11-28

## 2024-11-27 RX ADMIN — TAMSULOSIN HYDROCHLORIDE 0.4 MG: 0.4 CAPSULE ORAL at 18:14

## 2024-11-27 RX ADMIN — PIPERACILLIN SODIUM AND TAZOBACTAM SODIUM 3.38 G: 3; .375 INJECTION, SOLUTION INTRAVENOUS at 04:46

## 2024-11-27 RX ADMIN — HYDROMORPHONE HYDROCHLORIDE 0.5 MG: 1 INJECTION, SOLUTION INTRAMUSCULAR; INTRAVENOUS; SUBCUTANEOUS at 02:54

## 2024-11-27 RX ADMIN — HYDROMORPHONE HYDROCHLORIDE 0.5 MG: 1 INJECTION, SOLUTION INTRAMUSCULAR; INTRAVENOUS; SUBCUTANEOUS at 19:42

## 2024-11-27 RX ADMIN — HYDROMORPHONE HYDROCHLORIDE 0.5 MG: 1 INJECTION, SOLUTION INTRAMUSCULAR; INTRAVENOUS; SUBCUTANEOUS at 08:34

## 2024-11-27 RX ADMIN — SODIUM CHLORIDE, POTASSIUM CHLORIDE, SODIUM LACTATE AND CALCIUM CHLORIDE: 600; 310; 30; 20 INJECTION, SOLUTION INTRAVENOUS at 14:38

## 2024-11-27 RX ADMIN — SODIUM CHLORIDE, POTASSIUM CHLORIDE, SODIUM LACTATE AND CALCIUM CHLORIDE: 600; 310; 30; 20 INJECTION, SOLUTION INTRAVENOUS at 18:14

## 2024-11-27 RX ADMIN — SENNOSIDES AND DOCUSATE SODIUM 1 TABLET: 50; 8.6 TABLET ORAL at 02:59

## 2024-11-27 RX ADMIN — PIPERACILLIN SODIUM AND TAZOBACTAM SODIUM 3.38 G: 3; .375 INJECTION, SOLUTION INTRAVENOUS at 16:19

## 2024-11-27 RX ADMIN — HYDROMORPHONE HYDROCHLORIDE 0.5 MG: 1 INJECTION, SOLUTION INTRAMUSCULAR; INTRAVENOUS; SUBCUTANEOUS at 13:33

## 2024-11-27 RX ADMIN — PIPERACILLIN SODIUM AND TAZOBACTAM SODIUM 3.38 G: 3; .375 INJECTION, SOLUTION INTRAVENOUS at 10:14

## 2024-11-27 RX ADMIN — SODIUM CHLORIDE, POTASSIUM CHLORIDE, SODIUM LACTATE AND CALCIUM CHLORIDE: 600; 310; 30; 20 INJECTION, SOLUTION INTRAVENOUS at 05:22

## 2024-11-27 RX ADMIN — PIPERACILLIN SODIUM AND TAZOBACTAM SODIUM 3.38 G: 3; .375 INJECTION, SOLUTION INTRAVENOUS at 22:15

## 2024-11-27 ASSESSMENT — ACTIVITIES OF DAILY LIVING (ADL)
ADLS_ACUITY_SCORE: 48
ADLS_ACUITY_SCORE: 48
ADLS_ACUITY_SCORE: 41
ADLS_ACUITY_SCORE: 46
ADLS_ACUITY_SCORE: 33
ADLS_ACUITY_SCORE: 46
ADLS_ACUITY_SCORE: 41
ADLS_ACUITY_SCORE: 46
ADLS_ACUITY_SCORE: 41
ADLS_ACUITY_SCORE: 46
ADLS_ACUITY_SCORE: 41
ADLS_ACUITY_SCORE: 46
ADLS_ACUITY_SCORE: 41
ADLS_ACUITY_SCORE: 46
ADLS_ACUITY_SCORE: 48
DEPENDENT_IADLS:: INDEPENDENT
ADLS_ACUITY_SCORE: 41
ADLS_ACUITY_SCORE: 48
ADLS_ACUITY_SCORE: 48

## 2024-11-27 NOTE — CONSULTS
Care Management Initial Consult    General Information  Assessment completed with: Patient,    Type of CM/SW Visit: Initial Assessment    Primary Care Provider verified and updated as needed: Yes   Readmission within the last 30 days: no previous admission in last 30 days      Reason for Consult: insurance concerns  Advance Care Planning: Advance Care Planning Reviewed: no concerns identified          Communication Assessment  Patient's communication style: spoken language (English or Bilingual)        Cognitive  Cognitive/Neuro/Behavioral: .WDL except, mood/behavior  Level of Consciousness: alert        Mood/Behavior: cooperative, agitated          Living Environment:   People in home: alone     Current living Arrangements: apartment      Able to return to prior arrangements: yes       Family/Social Support:  Care provided by: self  Provides care for: no one  Marital Status: Single  Support system: Children          Description of Support System: Supportive    Support Assessment: Patient communicates needs well met    Current Resources:   Patient receiving home care services: No        Community Resources: , County Programs ([place])  Equipment currently used at home: walker, rolling  Supplies currently used at home: None    Employment/Financial:  Employment Status: disabled     Employment/ Comments: [employer], 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  No    Lifestyle & Psychosocial Needs:  Social Drivers of Health     Food Insecurity: Not on file   Depression: Not on file   Housing Stability: Not on file   Tobacco Use: High Risk (11/26/2024)    Patient History     Smoking Tobacco Use: Every Day     Smokeless Tobacco Use: Never     Passive Exposure: Not on file   Financial Resource Strain: Not on file   Alcohol Use: Not on file   Transportation Needs: Not on file    Physical Activity: Not on file   Interpersonal Safety: Not on file   Stress: Not on file   Social Connections: Not on file   Health Literacy: Not on file       Functional Status:  Prior to admission patient needed assistance:   Dependent ADLs:: Independent, Ambulation-walker  Dependent IADLs:: Independent  Assesssment of Functional Status: At functional baseline    Mental Health Status:  Mental Health Status: No Current Concerns       Chemical Dependency Status:  Chemical Dependency Status: Current Concern  Chemical Dependency Management: Other (see comment) (uses medical THC and occasionally takes adderall when he feels he needs it)          Values/Beliefs:  Spiritual, Cultural Beliefs, Zoroastrian Practices, Values that affect care: no               Discussed  Partnership in Safe Discharge Planning  document with patient/family: No    Additional Information:  SW consulted due to concerns re insurance. Patient wishes to access MA. He is on disability and [facility]-connected to [facility]. He wishes to use MA as primary and [facility] as secondary. SW provided information on [employer]ability [employer] and applying for MA online; patient has his laptop with him and a hot spot. He currently does not have any outstanding medical bills and has not experienced a lapse in services due to insurance problems.    Next Steps: No further care management intervention anticipated at this time.  Please re-consult if further needs arise.  Care management signing off.      ________________    ESHA Christine, LGSW  ED/Observation   [facility]  Phone: [phone]  Fax: [phone]    After hours [facility] and After Hours Collections  Available from 4:00pm - 8:30pm

## 2024-11-27 NOTE — PROGRESS NOTES
Surgery Progress Note  11/27/2024       Subjective:  - Mr Jeniffer states that his pain is better than yesterday. He describes the pain as crampy and mostly in his lower quadrants left>right. He has not had anything to eat in the last 5 days, his last bowel movement was a few days ago. He is passing flatus. He also states his urine output has decreased and feels dehydrated.     He is concerned because his gf has some learning issues and is worried that she is alone over thanksgiving (tomorrow). He asked the nursing staff to have her stay with him. They were amenable to this.      Objective:  Temp:  [97.6  F (36.4  C)-98.5  F (36.9  C)] 98.2  F (36.8  C)  Pulse:  [] 77  Resp:  [16-24] 16  BP: ()/(69-90) 98/73  SpO2:  [95 %-100 %] 100 %    I/O last 3 completed shifts:  In: 218.33 [I.V.:218.33]  Out: -       Constitutional: The patient is awake and alert awoken from sleep for exam   HEENT: Normocephalic, atraumatic.   Cardiovascular: Normal rate and rhythm, S1 normal and S2 normal.     Pulmonary/Chest: Effort normal, breath sounds clear bilaterally.     Abdominal: Protuberant and soft. The patient exhibits no distention. There is no hepatosplenomegaly.   There is mild tenderness in lower abdomen LLQ There is no rigidity or guarding.    Musculoskeletal:  The patient exhibits no deformity or signs of injury 2+ pulses.   Neurological: The patient is alert and oriented to person, place, time and situation.  Skin: Skin is warm and dry. No petechia and no rash. No jaundice noted.     Labs:  Recent Labs   Lab 11/27/24  0534 11/26/24  1303   WBC 10.3 11.4*   HGB 11.5* 13.2*    457*       Recent Labs   Lab 11/27/24  0534 11/26/24  1303    138   POTASSIUM 4.1 3.9   CHLORIDE 101 100   CO2 25 26   BUN 16.2 19.0   CR 1.23* 1.16   GLC 88 78   GUILLERMO 9.2 10.0   MAG  --  2.2   PHOS  --  3.3       Imaging:  CT Abdomen and Pelvis     IMPRESSION:   1. Sigmoid diverticulitis complicated by contained perforation  and  abscess formation. The abscess measures 2.7 x 2.7 cm.  2. Large hiatal hernia, worsened/new since 2019. No evidence of  ischemia.     Assessment/Plan:   Grant Swift is a 58 year old male with a history of diverticulitis, R inguinal hernia s/p laparoscopic repair 2 months ago complicated by recurrence, hyperlipidemia, cannabis dependence, bipolar disorder, GERD, known history of prior multiple episodes of diverticulitis, admitted with complicated sigmoid diverticulitis with intraabdominal abscess 2.7 cm. Abscess to too small for IR drain placement- continue medical management.    White count 11.4 at admission coming down to 10.3 today    Plan:  -appreciate cares per medicine primary team  -Agree with iv antibiotics  - Multimodal pain control  - N.p.o., bowel rest, IV fluid resuscitation   - Patient will need outpatient colonoscopy and colorectal surgery follow up outpatient for elective sigmoidectomy after discharge from this hospitalization  -Patient is eager to discharge home today to spend Thanksgiving with his girlfriend understandably, however we discussed with him he could get sicker with his infection/abscess/perforation worsening if he left the hospital with suboptimal management of his diverticulitis; especially with him not tolerating oral diet currently, significant abdominal pain, failed outpatient management with Augmentin only recently.  We were able to arrange for his girlfriend to stay with him in the hospital and he is amenable to staying for now.  We will reassess him in the afternoon.    Seen, examined, and discussed with chief resident, who discussed with staff.  - - - - - - - - - - - - - - - - - -  Doug Lane MS, MD Candidate    I, MOY, completed all elements of physical exam, medical decision making, orders and/or procedures  under the supervision of my above attending/resident.     IYolanda MD, was present with the medical student who participated in the service and in the  documentation of the note.  I have verified the history and personally performed the physical exam and medical decision making.  I agree with the assessment and plan of care as documented in the note.      I edited the note as appropriate    - - - - - - - - - - - - - - - - - -  Yolanda CHAVEZ  PGY2 General Surgery  [facility]

## 2024-11-27 NOTE — PROGRESS NOTES
[facility]    Medicine Progress Note - Hospitalist Service, GOLD TEAM 9    Date of Admission:  11/26/2024    Assessment & Plan      Grant Swift is a 58 year old male with past medical history significant for recurrent diverticulitis, R inguinal hernia s/p recent repair, HLD, TBI, cannabis use, bipolar disorder, and GERD admitted to [facility] for abdominal pain and found to have sigmoid diverticulitis with perforation and abscess.         Updates today  - continue IV zosyn, fluids, NPO status  - appreciate surgery involvement in care     # Acute sigmoid diverticulitis with perforation and abscess   # Leukocytosis   # Abdominal pain   Reports abdominal pain ongoing for two weeks.  [facility] to [facility], started on PO abx and sent home.  Reports pain has gotten progressively worse and notes no BM/flatus in 5 days.   Pt reports about 10 lifetime episodes of diverticulosis but feels this has been the most painful.  WBC slightly elevated at 11.4.  CT AP on admission with sigmoid diverticulitis c/b contained perforation and abscess formation, with abscess measuring 2.7 x 2.7 cm, and large hiatal hernia without e/o ischemia.  General surgery consulted in the ED, recommending NPO and abx for now and will continue to follow.  Per chart review, had similar presentation in 1/2019.    - Appreciate Gen Surgery recs   - NPO (meds ok)  - Continue IV Zosyn 4.5g Q6H  - Pain regimen: dilaudid 0.5mg IV Q3H PRN   - Fluids: LR 100ml/hr   - Workup  - Blood cx NGTD    - WBC 11.4-->10.3  - -->105  - Colorectal surgery referral for outpatient elective sigmoidectomy    # Cyclical diarrhea and constipation   Reports ongoing issue since his hernia surgery 2 months ago.  Having up to 5-6 episodes of loose stools daily.  Per chart review, had similar constellation of symptoms following a hernia repair in 4/2024.  Reporting weight loss ~ 10lbs as below.    -  GI referral placed for outpatient     # Thrombocytosis, resolved   Likely reactive in setting of diverticulitis, recent hernia repair.  Plt 457, now normalized.     # Urinary retention   # LUTS   Reports long standing slow flow and urinary frequency.  - Discussed starting tamsulosin, patient agreeable. Ordered.   - Urology referral placed for outpatient    # Concern for protein calorie malnutrition   Reports 10lb weight loss over the last 2 months in setting of above symptoms.  Reports poor PO intake for the last two months and essentially no PO intake in the last 5 days.   - Nutrition consult when no longer NPO   - Check Mag and Phos, wnl  - monitor for refeeding syndrome once taking PO     # Large hiatal hernia   # Recent R hernia repair (9/2024, 4/2024)  Large hiatal hernia noted on CT, worse since 2019.      # HLD   - Noted in charting, not currently on PTA meds     # Hx TBI (2013)   - On medical marijuana PTA     # Hx Bipolar disorder   - Noted in charting, not currently on PTA meds      # GERD   - Noted in charting, not currently on PTA meds            Diet: NPO per Anesthesia Guidelines for Procedure/Surgery Except for: Meds    DVT Prophylaxis: Ambulate every shift  Pierre Catheter: Not present  Lines: None     Cardiac Monitoring: None  Code Status: Full Code      Clinically Significant Risk Factors Present on Admission            # Hypercalcemia: corrected calcium is >10.1, will monitor as appropriate    # Hypoalbuminemia: Lowest albumin = 3.1 g/dL at 11/27/2024  5:34 AM, will monitor as appropriate                          Social Drivers of Health    Tobacco Use: High Risk (11/26/2024)    Patient History     Smoking Tobacco Use: Every Day     Smokeless Tobacco Use: Never          Disposition Plan     Medically Ready for Discharge: Anticipated in 2-4 Days             Janessa Feldman MD  Hospitalist Service, GOLD TEAM [address] [place] [address]  Securely message with [employer]  (more info)  Text page via [facility] Paging/Directory   See signed in provider for up to date coverage information  ______________________________________________________________________    Interval History   Patient admitted overnight  Patient noticed increased pain when attempting to have a BM this morning. Pain had otherwise been improving prior to this.    Physical Exam   Vital Signs: Temp: 98.2  F (36.8  C) Temp src: Oral BP: 98/73 Pulse: 77   Resp: 16 SpO2: 100 % O2 Device: None (Room air)    Weight: 132 lbs 1.6 oz  General: alert, non-toxic appearing, no acute distress   HEENT: normocephalic, atraumatic  Respiratory: comfortable conversational breathing on room air, no increased work of breathing. Clear to auscultation in all lobes bilaterally.   Cardiac: warm and well perfused extremities.   Abdomen: Non-distended. Soft, mildly tender to palpation over LLQ, otherwise not significantly tender throughout   \  Medical Decision Making       45 MINUTES SPENT BY ME on the date of service doing chart review, history, exam, documentation & further activities per the note.      Data     I have personally reviewed the following data over the past 24 hrs:    10.3  \   11.5 (L)   / 373     136 101 16.2 /  88   4.1 25 1.23 (H) \     ALT: 20 AST: 15 AP: 119 TBILI: 0.4   ALB: 3.1 (L) TOT PROTEIN: 6.2 (L) LIPASE: N/A     Procal: N/A CRP: 105.00 (H) Lactic Acid: N/A         Imaging results reviewed over the past 24 hrs:   No results found for this or any previous visit (from the past 24 hours).

## 2024-11-27 NOTE — PROGRESS NOTES
"Shift: [id number]    VS: Blood pressure 94/69, pulse 77, temperature 97.9  F (36.6  C), temperature source Oral, resp. rate 16, height 1.702 m (5' 7\"), weight 59.9 kg (132 lb 1.6 oz), SpO2 97%.    Pain:  Reported right lower abdominal pain at 7/10. Pain managed with IV dilaudid    Neuro: A&O x4    Cardiac: WDL. No report of chest pain.    Respiratory: on room air. No report of SOB    GI/Diet/Appetite:  NPO. Except for meds. No report of nausea/vomiting. Reported abdominal discomfort. Last BM 11/21/24. No BM during shift reported passing gas. Stool softener administered.    : Voided during shift without report of pain/discomfort.    LDA's: 2PIV. R.PIV infusing LR @100 ml/hr. Left PIV saline locked for ABX    Skin: No new skin deficit noted.    Activity: Ambulated outside of room to the bathroom       Plan: Pain management , monitor soft BP and continue POC  "

## 2024-11-27 NOTE — PLAN OF CARE
Goal Outcome Evaluation:      Plan of Care Reviewed With: patient          Outcome Evaluation: anticipate d/c back to his apartment

## 2024-11-27 NOTE — CARE PLAN
PATIENT  CAME WITH BLANKET, BLACK JACKET , RED WALKER ,BLACK BACKPACK. Two cellphones and laptop/ hotspot. Also hAD CLOTHING IN BACKPACK SWEATSHIRTS, SOCKS, SHOES. Chargers for electronics as well. Also came with glasses.

## 2024-11-27 NOTE — MEDICATION SCRIBE - ADMISSION MEDICATION HISTORY
Medication Scribe Admission Medication History    Admission medication history is complete. The information provided in this note is only as accurate as the sources available at the time of the update.    Information Source(s): Patient via in-person    Pertinent Information:   Patient confirmed he is only taking Ibuprofen, Patient takes 3--4 tabs    Changes made to PTA medication list:  Added: None  Deleted: ACETAMINOPHEN PO     Take 500 mg by mouth every 4 hours as needed Takes 1-2 tabs   Ibuprofen PO Take 200 mg by mouth every 6 hours as needed Patient takes 3--4 tabs  Changed: None    Allergies reviewed with patient and updates made in EHR: yes    Medication History Completed By: Joana Garvin 11/26/2024 7:30 PM    PTA Med List   Medication Sig Last Dose/Taking    ibuprofen (ADVIL/MOTRIN) 200 MG tablet Take 200 mg by mouth every 4 hours as needed for pain. Unknown

## 2024-11-27 NOTE — DISCHARGE INSTRUCTIONS
You can apply for MA at [email or web address]. You can access the disability hub by going to [email or web address].

## 2024-11-27 NOTE — PLAN OF CARE
Neuro: A&Ox4.   Cardiac: VSS  Respiratory: Sating above 96% on RA.  GI/: Bowel sounds active, No BM this shift, pt reports urinating x3  Diet/appetite: NPO for bowel rest  Activity:  Independent  Pain: At acceptable level on current regimen.   Skin: No new deficits noted.  LDA's: R PIV LR @ 100 L PIV SL    Plan: Bowel rest and antibiotics      Handoff report given to RN on 7C, pt transported via stretcher [id number]

## 2024-11-28 VITALS
BODY MASS INDEX: 20.56 KG/M2 | SYSTOLIC BLOOD PRESSURE: 94 MMHG | DIASTOLIC BLOOD PRESSURE: 60 MMHG | HEIGHT: 67 IN | RESPIRATION RATE: 17 BRPM | TEMPERATURE: 98.5 F | HEART RATE: 90 BPM | WEIGHT: 131 LBS | OXYGEN SATURATION: 99 %

## 2024-11-28 LAB
ALBUMIN SERPL BCG-MCNC: 3.2 G/DL (ref 3.5–5.2)
ALP SERPL-CCNC: 111 U/L (ref 40–150)
ALT SERPL W P-5'-P-CCNC: 18 U/L (ref 0–70)
ANION GAP SERPL CALCULATED.3IONS-SCNC: 11 MMOL/L (ref 7–15)
AST SERPL W P-5'-P-CCNC: 14 U/L (ref 0–45)
BACTERIA BLD CULT: NORMAL
BILIRUB SERPL-MCNC: 0.4 MG/DL
BUN SERPL-MCNC: 12.7 MG/DL (ref 8–23)
CALCIUM SERPL-MCNC: 9 MG/DL (ref 8.8–10.4)
CHLORIDE SERPL-SCNC: 101 MMOL/L (ref 98–107)
CREAT SERPL-MCNC: 1.18 MG/DL (ref 0.67–1.17)
CRP SERPL-MCNC: 107 MG/L
EGFRCR SERPLBLD CKD-EPI 2021: 71 ML/MIN/1.73M2
ERYTHROCYTE [DISTWIDTH] IN BLOOD BY AUTOMATED COUNT: 15.6 % (ref 10–15)
GLUCOSE SERPL-MCNC: 84 MG/DL (ref 70–99)
HCO3 SERPL-SCNC: 24 MMOL/L (ref 22–29)
HCT VFR BLD AUTO: 36.9 % (ref 40–53)
HGB BLD-MCNC: 12 G/DL (ref 13.3–17.7)
MAGNESIUM SERPL-MCNC: 1.8 MG/DL (ref 1.7–2.3)
MCH RBC QN AUTO: 24.8 PG (ref 26.5–33)
MCHC RBC AUTO-ENTMCNC: 32.5 G/DL (ref 31.5–36.5)
MCV RBC AUTO: 76 FL (ref 78–100)
PHOSPHATE SERPL-MCNC: 2.8 MG/DL (ref 2.5–4.5)
PLATELET # BLD AUTO: 380 10E3/UL (ref 150–450)
POTASSIUM SERPL-SCNC: 4 MMOL/L (ref 3.4–5.3)
PROT SERPL-MCNC: 6.4 G/DL (ref 6.4–8.3)
RBC # BLD AUTO: 4.84 10E6/UL (ref 4.4–5.9)
SODIUM SERPL-SCNC: 136 MMOL/L (ref 135–145)
WBC # BLD AUTO: 8.5 10E3/UL (ref 4–11)

## 2024-11-28 PROCEDURE — 36415 COLL VENOUS BLD VENIPUNCTURE: CPT

## 2024-11-28 PROCEDURE — 84100 ASSAY OF PHOSPHORUS: CPT

## 2024-11-28 PROCEDURE — 82310 ASSAY OF CALCIUM: CPT

## 2024-11-28 PROCEDURE — 250N000013 HC RX MED GY IP 250 OP 250 PS 637: Performed by: NURSE PRACTITIONER

## 2024-11-28 PROCEDURE — 86140 C-REACTIVE PROTEIN: CPT

## 2024-11-28 PROCEDURE — 83735 ASSAY OF MAGNESIUM: CPT

## 2024-11-28 PROCEDURE — 258N000003 HC RX IP 258 OP 636

## 2024-11-28 PROCEDURE — 250N000013 HC RX MED GY IP 250 OP 250 PS 637

## 2024-11-28 PROCEDURE — 80053 COMPREHEN METABOLIC PANEL: CPT

## 2024-11-28 PROCEDURE — 85041 AUTOMATED RBC COUNT: CPT

## 2024-11-28 PROCEDURE — 99233 SBSQ HOSP IP/OBS HIGH 50: CPT

## 2024-11-28 PROCEDURE — 85014 HEMATOCRIT: CPT

## 2024-11-28 PROCEDURE — 250N000011 HC RX IP 250 OP 636

## 2024-11-28 PROCEDURE — 120N000002 HC R&B MED SURG/OB UMMC

## 2024-11-28 RX ORDER — POLYETHYLENE GLYCOL 3350 17 G/17G
17 POWDER, FOR SOLUTION ORAL 2 TIMES DAILY
Status: DISCONTINUED | OUTPATIENT
Start: 2024-11-28 | End: 2024-11-29 | Stop reason: HOSPADM

## 2024-11-28 RX ORDER — SODIUM CHLORIDE, SODIUM LACTATE, POTASSIUM CHLORIDE, CALCIUM CHLORIDE 600; 310; 30; 20 MG/100ML; MG/100ML; MG/100ML; MG/100ML
INJECTION, SOLUTION INTRAVENOUS CONTINUOUS
Status: DISCONTINUED | OUTPATIENT
Start: 2024-11-28 | End: 2024-11-29 | Stop reason: HOSPADM

## 2024-11-28 RX ORDER — METRONIDAZOLE 500 MG/1
500 TABLET ORAL 3 TIMES DAILY
Status: DISCONTINUED | OUTPATIENT
Start: 2024-11-28 | End: 2024-11-29 | Stop reason: HOSPADM

## 2024-11-28 RX ORDER — AMOXICILLIN 250 MG
2 CAPSULE ORAL 2 TIMES DAILY
Status: DISCONTINUED | OUTPATIENT
Start: 2024-11-28 | End: 2024-11-29 | Stop reason: HOSPADM

## 2024-11-28 RX ORDER — BISACODYL 10 MG
10 SUPPOSITORY, RECTAL RECTAL DAILY PRN
Status: DISCONTINUED | OUTPATIENT
Start: 2024-11-28 | End: 2024-11-29 | Stop reason: HOSPADM

## 2024-11-28 RX ORDER — CIPROFLOXACIN 500 MG/1
500 TABLET, FILM COATED ORAL EVERY 12 HOURS SCHEDULED
Status: DISCONTINUED | OUTPATIENT
Start: 2024-11-28 | End: 2024-11-29 | Stop reason: HOSPADM

## 2024-11-28 RX ADMIN — SENNOSIDES AND DOCUSATE SODIUM 2 TABLET: 8.6; 5 TABLET ORAL at 19:47

## 2024-11-28 RX ADMIN — HYDROMORPHONE HYDROCHLORIDE 0.5 MG: 1 INJECTION, SOLUTION INTRAMUSCULAR; INTRAVENOUS; SUBCUTANEOUS at 01:51

## 2024-11-28 RX ADMIN — PIPERACILLIN SODIUM AND TAZOBACTAM SODIUM 3.38 G: 3; .375 INJECTION, SOLUTION INTRAVENOUS at 04:38

## 2024-11-28 RX ADMIN — HYDROMORPHONE HYDROCHLORIDE 0.5 MG: 1 INJECTION, SOLUTION INTRAMUSCULAR; INTRAVENOUS; SUBCUTANEOUS at 14:57

## 2024-11-28 RX ADMIN — SODIUM CHLORIDE, POTASSIUM CHLORIDE, SODIUM LACTATE AND CALCIUM CHLORIDE: 600; 310; 30; 20 INJECTION, SOLUTION INTRAVENOUS at 02:10

## 2024-11-28 RX ADMIN — POLYETHYLENE GLYCOL 3350 17 G: 17 POWDER, FOR SOLUTION ORAL at 11:06

## 2024-11-28 RX ADMIN — CIPROFLOXACIN 500 MG: 500 TABLET ORAL at 21:45

## 2024-11-28 RX ADMIN — HYDROMORPHONE HYDROCHLORIDE 0.5 MG: 1 INJECTION, SOLUTION INTRAMUSCULAR; INTRAVENOUS; SUBCUTANEOUS at 08:53

## 2024-11-28 RX ADMIN — SODIUM CHLORIDE, POTASSIUM CHLORIDE, SODIUM LACTATE AND CALCIUM CHLORIDE: 600; 310; 30; 20 INJECTION, SOLUTION INTRAVENOUS at 21:45

## 2024-11-28 RX ADMIN — TAMSULOSIN HYDROCHLORIDE 0.4 MG: 0.4 CAPSULE ORAL at 08:46

## 2024-11-28 RX ADMIN — PIPERACILLIN SODIUM AND TAZOBACTAM SODIUM 3.38 G: 3; .375 INJECTION, SOLUTION INTRAVENOUS at 11:07

## 2024-11-28 RX ADMIN — SENNOSIDES AND DOCUSATE SODIUM 2 TABLET: 50; 8.6 TABLET ORAL at 08:46

## 2024-11-28 RX ADMIN — SODIUM CHLORIDE, POTASSIUM CHLORIDE, SODIUM LACTATE AND CALCIUM CHLORIDE: 600; 310; 30; 20 INJECTION, SOLUTION INTRAVENOUS at 11:06

## 2024-11-28 RX ADMIN — CIPROFLOXACIN 500 MG: 500 TABLET ORAL at 14:51

## 2024-11-28 RX ADMIN — METRONIDAZOLE 500 MG: 500 TABLET ORAL at 19:48

## 2024-11-28 RX ADMIN — POLYETHYLENE GLYCOL 3350 17 G: 17 POWDER, FOR SOLUTION ORAL at 19:47

## 2024-11-28 RX ADMIN — METRONIDAZOLE 500 MG: 500 TABLET ORAL at 14:51

## 2024-11-28 ASSESSMENT — ACTIVITIES OF DAILY LIVING (ADL)
ADLS_ACUITY_SCORE: 41

## 2024-11-28 NOTE — PLAN OF CARE
Goal Outcome Evaluation:      Plan of Care Reviewed With: patient    Overall Patient Progress: no changeOverall Patient Progress: no change    Outcome Evaluation: A/Ox4, VSS, RA. Pain medication requested 2x this shift for abd pain. Pt was able to fall asleep inbetween cares. Still running LR at 100ml/hr and was paused to give IV Zosyn which is not compatible with LR. LR resumed after abx finished and bag was changed this shift. Pt able to move independly around room. Spent time with girlfriend watching TV shows. No acute events occured this shift. Please continue with POC.

## 2024-11-28 NOTE — PROGRESS NOTES
Admitted/transferred from: ED  2 RN full   skin assessment completed by Serene Cuevas RN and Annmarie Nieto.  Skin assessment finding: skin intact, no problems. Both arms tattooed.   Interventions/actions: other skin intact      Will continue to monitor.

## 2024-11-28 NOTE — PROGRESS NOTES
[facility]    Medicine Progress Note - Hospitalist Service, GOLD TEAM 9    Date of Admission:  11/26/2024    Assessment & Plan   Grant Swift is a 58 year old male with past medical history significant for recurrent diverticulitis, R inguinal hernia s/p recent repair, HLD, TBI, cannabis use, bipolar disorder, and GERD admitted to [facility] for abdominal pain and found to have sigmoid diverticulitis with perforation and abscess.         Updates today  - symptoms and labs improving  - switch from IV zosyn to PO flagyl and cipro, anticipate a total 10-14 day abx course  - continue IV fluids   - start miralax, senna   - diet advanced to low residual  - management discussed with surgery    # Acute sigmoid diverticulitis with perforation and abscess   # Leukocytosis   # Abdominal pain   Reports abdominal pain ongoing for two weeks.  [facility], started on PO abx (augmentin) and sent home.  Reports pain has gotten progressively worse and notes no BM/flatus in 5 days.   Pt reports about 10 lifetime episodes of diverticulosis but feels this has been the most painful.  WBC slightly elevated at 11.4.  CT AP on admission with sigmoid diverticulitis c/b contained perforation and abscess formation, with abscess measuring 2.7 x 2.7 cm, and large hiatal hernia without e/o ischemia.  Abscess is too small for IR drain placement. General surgery consulted for assistance with management.   - Appreciate Gen Surgery recs   - Abx   - zosyn 11/26-11/28   - cipro + metronidazole 11/28 -. Anticipate 10-14 day total course  - Diet: low residual diet   - Pain regimen: dilaudid 0.5mg IV Q3H PRN   - Fluids: LR 100ml/hr   - Workup  - Blood cx NGTD    - WBC 11.4-->10.3  - -->105  - Colorectal surgery referral for outpatient elective sigmoidectomy    # Cyclical diarrhea and constipation   Reports ongoing issue since his hernia surgery 2 months ago.   Having up to 5-6 episodes of loose stools daily.  Per chart review, had similar constellation of symptoms following a hernia repair in 4/2024.  Reporting weight loss ~ 10lbs as below.    - GI referral placed for outpatient     # Thrombocytosis, resolved   Likely reactive in setting of diverticulitis, recent hernia repair.  Plt 457, now normalized.     # Urinary retention   # LUTS   Reports long standing slow flow and urinary frequency.  - Discussed starting tamsulosin, patient agreeable. Ordered.   - Urology referral placed for outpatient    # Concern for protein calorie malnutrition   Reports 10lb weight loss over the last 2 months in setting of above symptoms.  Reports poor PO intake for the last two months and essentially no PO intake in the last 5 days.   - Nutrition consult when no longer NPO   - Check Mag and Phos, wnl  - monitor for refeeding syndrome once taking PO     # Large hiatal hernia   # Recent R hernia repair (9/2024, 4/2024)  Large hiatal hernia noted on CT, worse since 2019.      # HLD   - Noted in charting, not currently on PTA meds     # Hx TBI (2013)   - On medical marijuana PTA     # Hx Bipolar disorder   - Noted in charting, not currently on PTA meds      # GERD   - Noted in charting, not currently on PTA meds            Diet: Clear Liquid Diet    DVT Prophylaxis: Pneumatic Compression Devices  Pierre Catheter: Not present  Lines: None     Cardiac Monitoring: None  Code Status: Full Code      Clinically Significant Risk Factors            # Hypercalcemia: corrected calcium is >10.1, will monitor as appropriate    # Hypoalbuminemia: Lowest albumin = 3.1 g/dL at 11/27/2024  5:34 AM, will monitor as appropriate                    # Financial/Environmental Concerns: none         Social Drivers of Health    Tobacco Use: High Risk (11/26/2024)    Patient History     Smoking Tobacco Use: Every Day     Smokeless Tobacco Use: Never   Financial Resource Strain: High Risk (11/27/2024)    Financial  Resource Strain     Within the past 12 months, have you or your family members you live with been unable to get utilities (heat, electricity) when it was really needed?: Yes          Disposition Plan     Medically Ready for Discharge: Anticipated Tomorrow             Janessa Feldman MD  Hospitalist Service, GOLD TEAM 9  [facility]  Securely message with [employer] (more info)  Text page via [facility] Paging/Directory   See signed in provider for up to date coverage information  ______________________________________________________________________    Interval History   NAEO    Patient advanced to clear liquids last night and is interested in eating food today. Has not been able to have BM yet    Physical Exam   Vital Signs: Temp: 98.2  F (36.8  C) Temp src: Oral BP: 91/58 Pulse: 86   Resp: 18 SpO2: 95 % O2 Device: None (Room air)    Weight: 132 lbs 1.6 oz    General: alert, non-toxic appearing, no acute distress   HEENT: normocephalic, atraumatic  Respiratory: comfortable conversational breathing on room air, no increased work of breathing. Clear to auscultation in all lobes bilaterally.   Cardiac: warm and well perfused extremities.   Abdomen: Non-distended. Soft, nontender to palpation throughout. Non-distended    Medical Decision Making       40 MINUTES SPENT BY ME on the date of service doing chart review, history, exam, documentation & further activities per the note.      Data     I have personally reviewed the following data over the past 24 hrs:    8.5  \   12.0 (L)   / 380     136 101 12.7 /  84   4.0 24 1.18 (H) \     ALT: 18 AST: 14 AP: 111 TBILI: 0.4   ALB: 3.2 (L) TOT PROTEIN: 6.4 LIPASE: N/A     Procal: N/A CRP: 107.00 (H) Lactic Acid: N/A         Imaging results reviewed over the past 24 hrs:   No results found for this or any previous visit (from the past 24 hours).

## 2024-11-28 NOTE — PLAN OF CARE
"Goal Outcome Evaluation:    Pt admitted from ED, came to 7C at 1600. Pt patient settled in room, VSS. Pt is AO x 4. Pt state pain was a 2. MD order for pt to be on liquid diet. Pt on RA, up and independent. Took a shower right after being transferred. Pt reported no bowel movement or urination since coming to 7C. RN in ED reported pt having loose stool. Right peripheral IV was removed due to leakage. Last \"formed\" BM reported 11/21/2024. LR is infusing at 100 ml/ hour. Significant other is at bedside, was approved by charge nurse. Appetite fair, tolerating liquid diet. Plan of care ongoing, will notify MD of any changes.       Plan of Care Reviewed With: patient    Overall Patient Progress: improving           "

## 2024-11-29 VITALS
SYSTOLIC BLOOD PRESSURE: 103 MMHG | DIASTOLIC BLOOD PRESSURE: 67 MMHG | RESPIRATION RATE: 16 BRPM | HEIGHT: 67 IN | OXYGEN SATURATION: 98 % | WEIGHT: 131 LBS | BODY MASS INDEX: 20.56 KG/M2 | TEMPERATURE: 97.8 F | HEART RATE: 77 BPM

## 2024-11-29 LAB
ALBUMIN SERPL BCG-MCNC: 2.9 G/DL (ref 3.5–5.2)
ALP SERPL-CCNC: 93 U/L (ref 40–150)
ALT SERPL W P-5'-P-CCNC: 13 U/L (ref 0–70)
ANION GAP SERPL CALCULATED.3IONS-SCNC: 7 MMOL/L (ref 7–15)
AST SERPL W P-5'-P-CCNC: 13 U/L (ref 0–45)
BILIRUB SERPL-MCNC: 0.2 MG/DL
BUN SERPL-MCNC: 10.5 MG/DL (ref 8–23)
CALCIUM SERPL-MCNC: 8.7 MG/DL (ref 8.8–10.4)
CHLORIDE SERPL-SCNC: 102 MMOL/L (ref 98–107)
CREAT SERPL-MCNC: 1.06 MG/DL (ref 0.67–1.17)
CRP SERPL-MCNC: 87.8 MG/L
EGFRCR SERPLBLD CKD-EPI 2021: 81 ML/MIN/1.73M2
ERYTHROCYTE [DISTWIDTH] IN BLOOD BY AUTOMATED COUNT: 15.4 % (ref 10–15)
GLUCOSE SERPL-MCNC: 88 MG/DL (ref 70–99)
HCO3 SERPL-SCNC: 25 MMOL/L (ref 22–29)
HCT VFR BLD AUTO: 32.5 % (ref 40–53)
HGB BLD-MCNC: 10.6 G/DL (ref 13.3–17.7)
MAGNESIUM SERPL-MCNC: 1.7 MG/DL (ref 1.7–2.3)
MCH RBC QN AUTO: 24.9 PG (ref 26.5–33)
MCHC RBC AUTO-ENTMCNC: 32.6 G/DL (ref 31.5–36.5)
MCV RBC AUTO: 76 FL (ref 78–100)
PHOSPHATE SERPL-MCNC: 2.5 MG/DL (ref 2.5–4.5)
PLATELET # BLD AUTO: 345 10E3/UL (ref 150–450)
POTASSIUM SERPL-SCNC: 3.7 MMOL/L (ref 3.4–5.3)
PROT SERPL-MCNC: 5.6 G/DL (ref 6.4–8.3)
RBC # BLD AUTO: 4.26 10E6/UL (ref 4.4–5.9)
SODIUM SERPL-SCNC: 134 MMOL/L (ref 135–145)
WBC # BLD AUTO: 8 10E3/UL (ref 4–11)

## 2024-11-29 PROCEDURE — 84520 ASSAY OF UREA NITROGEN: CPT

## 2024-11-29 PROCEDURE — 99239 HOSP IP/OBS DSCHRG MGMT >30: CPT

## 2024-11-29 PROCEDURE — 86140 C-REACTIVE PROTEIN: CPT

## 2024-11-29 PROCEDURE — 250N000013 HC RX MED GY IP 250 OP 250 PS 637

## 2024-11-29 PROCEDURE — 258N000003 HC RX IP 258 OP 636

## 2024-11-29 PROCEDURE — 82310 ASSAY OF CALCIUM: CPT

## 2024-11-29 PROCEDURE — 84100 ASSAY OF PHOSPHORUS: CPT

## 2024-11-29 PROCEDURE — 85018 HEMOGLOBIN: CPT

## 2024-11-29 PROCEDURE — 250N000011 HC RX IP 250 OP 636

## 2024-11-29 PROCEDURE — 83735 ASSAY OF MAGNESIUM: CPT

## 2024-11-29 PROCEDURE — 36415 COLL VENOUS BLD VENIPUNCTURE: CPT

## 2024-11-29 RX ORDER — PANTOPRAZOLE SODIUM 40 MG/1
40 TABLET, DELAYED RELEASE ORAL DAILY
Qty: 90 TABLET | Refills: 0 | Status: SHIPPED | OUTPATIENT
Start: 2024-11-29

## 2024-11-29 RX ORDER — TAMSULOSIN HYDROCHLORIDE 0.4 MG/1
0.4 CAPSULE ORAL DAILY
Qty: 90 CAPSULE | Refills: 0 | Status: SHIPPED | OUTPATIENT
Start: 2024-11-30

## 2024-11-29 RX ORDER — NALOXONE HYDROCHLORIDE 0.4 MG/ML
0.4 INJECTION, SOLUTION INTRAMUSCULAR; INTRAVENOUS; SUBCUTANEOUS
Status: DISCONTINUED | OUTPATIENT
Start: 2024-11-29 | End: 2024-11-29 | Stop reason: HOSPADM

## 2024-11-29 RX ORDER — METRONIDAZOLE 500 MG/1
500 TABLET ORAL 3 TIMES DAILY
Qty: 36 TABLET | Refills: 0 | Status: SHIPPED | OUTPATIENT
Start: 2024-11-29

## 2024-11-29 RX ORDER — OXYCODONE HYDROCHLORIDE 5 MG/1
5 TABLET ORAL EVERY 4 HOURS PRN
Qty: 20 TABLET | Refills: 0 | Status: SHIPPED | OUTPATIENT
Start: 2024-11-29

## 2024-11-29 RX ORDER — OXYCODONE HYDROCHLORIDE 5 MG/1
5 TABLET ORAL EVERY 4 HOURS PRN
Status: DISCONTINUED | OUTPATIENT
Start: 2024-11-29 | End: 2024-11-29 | Stop reason: HOSPADM

## 2024-11-29 RX ORDER — ACETAMINOPHEN 325 MG/1
975 TABLET ORAL 3 TIMES DAILY
Status: DISCONTINUED | OUTPATIENT
Start: 2024-11-29 | End: 2024-11-29 | Stop reason: HOSPADM

## 2024-11-29 RX ORDER — POLYETHYLENE GLYCOL 3350 17 G/17G
17 POWDER, FOR SOLUTION ORAL DAILY
Qty: 510 G | Refills: 1 | Status: SHIPPED | OUTPATIENT
Start: 2024-11-29

## 2024-11-29 RX ORDER — AMOXICILLIN 250 MG
1 CAPSULE ORAL DAILY
Qty: 90 TABLET | Refills: 0 | Status: SHIPPED | OUTPATIENT
Start: 2024-11-29

## 2024-11-29 RX ORDER — CIPROFLOXACIN 500 MG/1
500 TABLET, FILM COATED ORAL EVERY 12 HOURS
Qty: 24 TABLET | Refills: 0 | Status: SHIPPED | OUTPATIENT
Start: 2024-11-29

## 2024-11-29 RX ORDER — NALOXONE HYDROCHLORIDE 0.4 MG/ML
0.2 INJECTION, SOLUTION INTRAMUSCULAR; INTRAVENOUS; SUBCUTANEOUS
Status: DISCONTINUED | OUTPATIENT
Start: 2024-11-29 | End: 2024-11-29 | Stop reason: HOSPADM

## 2024-11-29 RX ORDER — ACETAMINOPHEN 500 MG
1000 TABLET ORAL 3 TIMES DAILY PRN
Qty: 200 TABLET | Refills: 0 | Status: SHIPPED | OUTPATIENT
Start: 2024-11-29

## 2024-11-29 RX ORDER — HYDROMORPHONE HYDROCHLORIDE 1 MG/ML
0.5 INJECTION, SOLUTION INTRAMUSCULAR; INTRAVENOUS; SUBCUTANEOUS
Status: DISCONTINUED | OUTPATIENT
Start: 2024-11-29 | End: 2024-11-29 | Stop reason: HOSPADM

## 2024-11-29 RX ADMIN — CIPROFLOXACIN 500 MG: 500 TABLET ORAL at 09:57

## 2024-11-29 RX ADMIN — TAMSULOSIN HYDROCHLORIDE 0.4 MG: 0.4 CAPSULE ORAL at 07:52

## 2024-11-29 RX ADMIN — ACETAMINOPHEN 975 MG: 325 TABLET, FILM COATED ORAL at 08:44

## 2024-11-29 RX ADMIN — SENNOSIDES AND DOCUSATE SODIUM 2 TABLET: 8.6; 5 TABLET ORAL at 07:51

## 2024-11-29 RX ADMIN — METRONIDAZOLE 500 MG: 500 TABLET ORAL at 07:52

## 2024-11-29 RX ADMIN — OXYCODONE HYDROCHLORIDE 5 MG: 5 TABLET ORAL at 08:44

## 2024-11-29 RX ADMIN — POLYETHYLENE GLYCOL 3350 17 G: 17 POWDER, FOR SOLUTION ORAL at 07:52

## 2024-11-29 RX ADMIN — HYDROMORPHONE HYDROCHLORIDE 0.5 MG: 1 INJECTION, SOLUTION INTRAMUSCULAR; INTRAVENOUS; SUBCUTANEOUS at 05:01

## 2024-11-29 RX ADMIN — SODIUM CHLORIDE, POTASSIUM CHLORIDE, SODIUM LACTATE AND CALCIUM CHLORIDE: 600; 310; 30; 20 INJECTION, SOLUTION INTRAVENOUS at 07:51

## 2024-11-29 ASSESSMENT — ACTIVITIES OF DAILY LIVING (ADL)
ADLS_ACUITY_SCORE: 41

## 2024-11-29 NOTE — DISCHARGE SUMMARY
[facility]  Hospitalist Discharge Summary      Date of Admission:  11/26/2024  Date of Discharge:  11/29/2024  2:18 PM  Discharging Provider: Janessa Feldman MD  Discharge Service: Hospitalist Service, GOLD TEAM 9    Discharge Diagnoses   Acute sigmoid diverticulitis with perforation and abscess   Leukocytosis   Abdominal pain  Cyclical diarrhea and constipation   Thrombocytosis  LUTS    Clinically Significant Risk Factors          Follow-ups Needed After Discharge   Follow-up Appointments       Adult [facility] Follow-up and recommended labs and tests      Follow up with primary care provider, [facility], within 7 days to evaluate treatment change and for hospital follow- up.  [facility].     Please follow-up with Urology, Colorectal Surgery, and Gastroenterology. You will be called to schedule these appointments through [place].     Appointments on [place] and/or [facility] (with [facility] or [facility] provider or service). Call [phone] if you haven't heard regarding these appointments within 7 days of discharge.                Unresulted Labs Ordered in the Past 30 Days of this Admission       Date and Time Order Name Status Description    11/26/2024  3:02 PM Blood Culture Peripheral Blood Preliminary         These results will be followed up by the Hospitalist service.      Discharge Disposition   Discharged to home  Condition at discharge: Stable    Hospital Course   Grant ROQUE Jeniffer is a 58 year old male with past medical history significant for recurrent diverticulitis, R inguinal hernia s/p recent repair, HLD, TBI, cannabis use, bipolar disorder, and GERD admitted to [facility] for abdominal pain and found to have sigmoid diverticulitis with perforation and abscess.       # Acute sigmoid diverticulitis with perforation and abscess   # Leukocytosis   # Abdominal pain   #Reactive thrombocytosis  Reports  abdominal pain ongoing for two weeks. Seen at the [facility] 5 days ago and found to have diverticulitis, started on PO abx (augmentin) and discharged home.  Patient reported pain has gotten progressively worse and notes no BM/flatus in 5 days.  Pt also reports about 10 lifetime episodes of diverticulosis but feels this has been the most painful.  Workup on admit revealed WBC 11.4, , and CT AP with sigmoid diverticulitis c/b contained perforation and abscess formation, with abscess measuring 2.7 x 2.7 cm, and large hiatal hernia without e/o ischemia.  Abscess is too small for IR drain placement. General surgery consulted for assistance with management. He was treated with IV zosyn, IV fluids, and bowel rest. His abdominal pain improved, his leukocytosis resolved, and his CRP downtrended consistently. He was transitioned to PO cipro and PO metronidazole one day prior to discharge. His diet was advanced and he was tolerating low fiber diet on day prior to discharge. He was discharged on 11/29 in stable condition to complete two week course of cipro and flayl.   - Abx   - zosyn 11/26-11/28   - cipro + metronidazole 11/28  to complete 14- day total course  - Diet: low residual diet   - Pain regimen: discharged with tylenol 1000mg TID, oxycodone 5 mg for breakthrough  - Workup  - Blood cx NGTD    - WBC 11.4-->8  - -->87  - Colorectal surgery referral at discharge for outpatient elective sigmoidectomy    # Large hiatal hernia, new or worse since 2019, no evidence of ischemia   # Recent R inguinal hernia repair (9/2024, 4/2024)  #GERD   Large hiatal hernia noted on CT, new or worse since 2019. Patient also having significant GERD symptoms which are likely related.   - start pantoprazole 40 mg daily  - recommend outpatient follow-up with PCP    # Urinary retention   # LUTS   Reports long standing slow flow and urinary frequency.   - started trial of tamsulosin 0.4mg daily  - urology referral placed for  outpatient evaluation     # Cyclical diarrhea and constipation   Reports ongoing issue since his hernia surgery 2 months ago.  Having up to 5-6 episodes of loose stools daily.  Per chart review, had similar constellation of symptoms following a hernia repair in 4/2024.  Reporting weight loss ~ 10lbs as below.    - GI referral placed for outpatient. Colonoscopy recommended  - Bowel regimen at discharge: Start miralax and senna 1 tab once daily, titrate up to miralax BID and senna 1 tab BID       Consultations This Hospital Stay   SURGERY GENERAL ADULT IP CONSULT  SOCIAL WORK IP CONSULT  GI LUMINAL ADULT IP CONSULT  SOCIAL WORK IP CONSULT    Code Status   Full Code    Time Spent on this Encounter   I, Janessa Feldman MD, personally saw the patient today and spent greater than 30 minutes discharging this patient.       Janessa Feldman MD  [address] MED SURG  [address] [facility] [address]  Phone: [phone]  ______________________________________________________________________    Physical Exam   Vital Signs: Temp: 97.8  F (36.6  C) Temp src: Oral BP: 103/67 Pulse: 77   Resp: 16 SpO2: 98 % O2 Device: None (Room air)    Weight: 131 lbs 0 oz  General: alert, non-toxic appearing, no acute distress   HEENT: normocephalic, atraumatic  Respiratory: comfortable conversational breathing on room air, no increased work of breathing.   Cardiac: regular rate  Abdomen: Non-distended. Soft       Primary Care Physician   [facility]    Discharge Orders      Adult GI  Referral - Consult Only      Adult Urology  Referral      Adult Colorectal Surgery  Referral      Reason for your hospital stay    Dear Grant Swift,    You were hospitalized at [facility] with diverticulitis. You were treated with IV antibiotics, fluids, and pain medicine.  Over your hospitalization your condition improved and today you are ready to be discharged  home. If you develop fever, shortness of breath, light headedness, chest pain, worsening or severe abdominal pain, or other symptoms that concern you, please seek medical attention.     We are recommending the following medication changes:   For diverticulitis infection. Take these Antibiotics:   - Take ciprofloxacin until gone  - Take metronidazole until gone    For pain:   - Take tylenol (acetaminophen) 1000mg up to three times per day  - If still having pain after taking tylenol, take one tablet oxycodone up to every 4 hours as needed.  - If pain is worsening or not getting better with pain medications, please seek medical attention right away.     For Constipation  - Take miralax liquid once daily. If you are not having a soft bowel movement every day, take twice per day.    - Take senna tablet once daily. If you are not having a soft bowel movement every day after increasing the miralax to twice per day, increase senna to twice per day.   - Decrease these medicines if you are having diarrhea.     For acid reflux:  - Start taking pantoprazole once daily. This will take a couple days to reach its full effect.    For urination:   - Continue taking tamsulosin once daily.       Please follow-up with:  - Colorectal surgery. You will be called to schedule  - Urology. You will be called to schedule  - Gastroenterology. You will be called to schedule  - Your primary care doctor in one week to make sure you are continuing to recover well.    Diet:  Please continue to follow a low fiber (low residue) diet as you continue to recover from diverticulitis.     Thank you for allowing me and my team the privilege of caring for you. We wish you the best in your ongoing recovery.     Activity    Your activity upon discharge: activity as tolerated     Adult [facility] Follow-up and recommended labs and tests    Follow up with primary care provider, [facility], within 7 days to evaluate treatment change  and for hospital follow- up.  [facility].     Please follow-up with Urology, Colorectal Surgery, and Gastroenterology. You will be called to schedule these appointments through [place].     Appointments on [place] and/or [facility] (with [facility] or [facility] provider or service). Call [phone] if you haven't heard regarding these appointments within 7 days of discharge.     Diet    Follow this diet upon discharge: Current Diet:Orders Placed This Encounter      Snacks/Supplements Adult: Ensure Enlive; With Meals      Low Fiber Diet       Significant Results and Procedures   Most Recent 3 CBC's:  Recent Labs   Lab Test 11/29/24  0551 11/28/24  0639 11/27/24  0534   WBC 8.0 8.5 10.3   HGB 10.6* 12.0* 11.5*   MCV 76* 76* 78    380 373     Most Recent 3 BMP's:  Recent Labs   Lab Test 11/29/24  0551 11/28/24  0639 11/27/24  0534   * 136 136   POTASSIUM 3.7 4.0 4.1   CHLORIDE 102 101 101   CO2 25 24 25   BUN 10.5 12.7 16.2   CR 1.06 1.18* 1.23*   ANIONGAP 7 11 10   GUILLERMO 8.7* 9.0 9.2   GLC 88 84 88     Most Recent 2 LFT's:  Recent Labs   Lab Test 11/29/24  0551 11/28/24  0639   AST 13 14   ALT 13 18   ALKPHOS 93 111   BILITOTAL 0.2 0.4     Most Recent 3 INR's:No lab results found.,   Results for orders placed or performed during the hospital encounter of 11/26/24   CT Abdomen Pelvis w Contrast    Narrative    EXAMINATION: CT ABDOMEN PELVIS W CONTRAST, 11/26/2024 2:43 PM    TECHNIQUE:  Helical CT images from the lung bases through the  symphysis pubis were obtained with IV contrast. Contrast dose: 81 ml  Isovue 370    COMPARISON: 1/3/2019    HISTORY: LLQ, suprapubic abdominal pain    FINDINGS:    Abdomen and pelvis:   Focal fatty infiltration along the falciform. No other focal hepatic  lesion. No intrahepatic or extrahepatic periductal dilatation. Normal  appearance of the gallbladder, spleen, pancreas, adrenal glands.  Symmetric enhancement of the kidneys without  evidence of  hydronephrosis or nephrolithiasis. Mildly thickened appearance of the  bladder, likely reactive to findings described below. Coarse prostatic  calcifications.    Evidence of sigmoid diverticulitis with contained perforation. There  is marked wall thickening of the sigmoid colon without evidence of  contained perforation and a 2.7 x 2.7 cm abscess containing a small  focus of gas (series 4, image 283). No dispersed free intraperitoneal  air. Along the sigmoid colon with mild associated pericolonic fat  stranding. Small bowel is normal in caliber. No bowel obstruction.  Normal appendix. Large hiatal hernia, worsened since 2019.    No abnormally enlarged lymph nodes in the abdomen or pelvis. Normal  caliber abdominal aorta. Major branches of the abdominal aorta are  patent. Mild scattered atherosclerotic changes. Portal vein is patent.    Lung bases: Stable 3 mm nodule in the right middle lobe (series 4,  image 14) - given this is stable since 2019, no follow-up necessary.  Previously seen central air nodule in the right lower lobe is resolved  since 2014. Remaining visualized lungs are clear. No effusion.    Bones and soft tissues: Chronic bilateral pars defects at L5 with  grade 1 anterolisthesis of L5 on S1. No acute or suspicious osseous  lesion. Sclerotic foci in the right femoral head and superior pubic  ramus likely representing benign bone islands. Bilateral inguinal  herniorrhaphy changes, new on the right since 1/3/2019.      Impression    IMPRESSION:   1. Sigmoid diverticulitis complicated by contained perforation and  abscess formation. The abscess measures 2.7 x 2.7 cm.  2. Large hiatal hernia, worsened/new since 2019. No evidence of  ischemia.    Findings were communicated to Dr. Orta from the ED by Dr. Ware in  radiology at 3:02 PM on 11/26/2024.    I have personally reviewed the examination and initial interpretation  and I agree with the findings.    VENKATESH POOLE, DO         SYSTEM  ID:  [id number]       Discharge Medications   Current Discharge Medication List        START taking these medications    Details   acetaminophen (TYLENOL) 500 MG tablet Take 2 tablets (1,000 mg) by mouth 3 times daily as needed (for moderate pain).  Qty: 200 tablet, Refills: 0    Associated Diagnoses: Diverticulitis      ciprofloxacin (CIPRO) 500 MG tablet Take 1 tablet (500 mg) by mouth every 12 hours. For diverticulitis infection  Qty: 24 tablet, Refills: 0    Associated Diagnoses: Diverticulitis      metroNIDAZOLE (FLAGYL) 500 MG tablet Take 1 tablet (500 mg) by mouth 3 times daily. For diverticulitis infection.  Qty: 36 tablet, Refills: 0    Associated Diagnoses: Diverticulitis      oxyCODONE (ROXICODONE) 5 MG tablet Take 1 tablet (5 mg) by mouth every 4 hours as needed for severe pain.  Qty: 20 tablet, Refills: 0    Associated Diagnoses: Diverticulitis      pantoprazole (PROTONIX) 40 MG EC tablet Take 1 tablet (40 mg) by mouth daily. For acid reflux.  Qty: 90 tablet, Refills: 0    Associated Diagnoses: Gastroesophageal reflux disease, unspecified whether esophagitis present      polyethylene glycol (MIRALAX) 17 GM/Dose powder Take 17 g by mouth daily. For constipation. You can increase to two times per day if needed.  Qty: 510 g, Refills: 1    Associated Diagnoses: Constipation, unspecified constipation type      senna-docusate (SENOKOT-S/PERICOLACE) 8.6-50 MG tablet Take 1 tablet by mouth daily. For constipation. Increase to two times per day if needed.  Qty: 90 tablet, Refills: 0    Associated Diagnoses: Constipation, unspecified constipation type      tamsulosin (FLOMAX) 0.4 MG capsule Take 1 capsule (0.4 mg) by mouth daily. For urination  Qty: 90 capsule, Refills: 0    Associated Diagnoses: Lower urinary tract symptoms (LUTS)           CONTINUE these medications which have NOT CHANGED    Details   ibuprofen (ADVIL/MOTRIN) 200 MG tablet Take 200 mg by mouth every 4 hours as needed for pain.           Allergies    Allergies   Allergen Reactions    Fish-Derived Products Nausea and Vomiting    Morphine Sulfate Itching    Mushroom Swelling

## 2024-11-29 NOTE — PROGRESS NOTES
Surgery Progress Note  11/28/2024       Subjective:  Patient's abdominal pain is better, tolerating clear liquid diet without nausea or vomiting.  Continuing to passing gas, but has not had a bowel movement this admission.     Objective:  Temp:  [98.2  F (36.8  C)-98.9  F (37.2  C)] 98.9  F (37.2  C)  Pulse:  [] 86  Resp:  [16-18] 16  BP: (91-94)/(54-66) 93/62  SpO2:  [95 %-99 %] 98 %    I/O last 3 completed shifts:  In: 1205 [P.O.:240; I.V.:965]  Out: -       Constitutional: The patient is awake and alert   HEENT: Normocephalic, atraumatic.   Cardiovascular: Normal rate and rhythm, S1 normal and S2 normal.     Pulmonary/Chest: Effort normal, breath sounds clear bilaterally.     Abdominal: Soft, nondistended, minimally tenderness in lower abdomen LLQ. There is no rigidity or guarding.    Musculoskeletal:  The patient exhibits no deformity or signs of injury 2+ pulses.   Neurological: The patient is alert and oriented to person, place, time and situation.  Skin: Skin is warm and dry. No petechia and no rash. No jaundice noted.     Labs:  Recent Labs   Lab 11/28/24  0639 11/27/24  0534 11/26/24  1303   WBC 8.5 10.3 11.4*   HGB 12.0* 11.5* 13.2*    373 457*       Recent Labs   Lab 11/28/24  0639 11/27/24  0534 11/26/24  1303    136 138   POTASSIUM 4.0 4.1 3.9   CHLORIDE 101 101 100   CO2 24 25 26   BUN 12.7 16.2 19.0   CR 1.18* 1.23* 1.16   GLC 84 88 78   GUILLERMO 9.0 9.2 10.0   MAG 1.8  --  2.2   PHOS 2.8  --  3.3       Imaging:  CT Abdomen and Pelvis     IMPRESSION:   1. Sigmoid diverticulitis complicated by contained perforation and  abscess formation. The abscess measures 2.7 x 2.7 cm.  2. Large hiatal hernia, worsened/new since 2019. No evidence of  ischemia.     Assessment/Plan:   Grant ROQUE Jeniffer is a 58 year old male with a history of diverticulitis, R inguinal hernia s/p laparoscopic repair 2 months ago complicated by recurrence, hyperlipidemia, cannabis dependence, bipolar disorder, GERD, known  history of prior multiple episodes of diverticulitis, admitted with complicated sigmoid diverticulitis with intraabdominal abscess 2.7 cm. Abscess to too small for IR drain placement- continue medical management.  Progressing appropriately.    Plan:  Bowel reg  Low residue diet  Switch from iv Oral antibiotics  Labs tomorrow to follow white count  Ok to discharge tomorrow if abdominal pain and tenderness improved, white count is normal, patient is tolerating a low residue diet  Patient will need outpatient colonoscopy and colorectal surgery follow up outpatient for elective sigmoidectomy after discharge from this hospitalization    Seen, examined, and discussed with chief resident, who discussed with staff.    - - - - - - - - - - - - - - - - - -  Yolanda CHAVEZ  PGY2 General Surgery  [facility]

## 2024-11-29 NOTE — PROGRESS NOTES
"CLINICAL NUTRITION SERVICES - ASSESSMENT NOTE     Nutrition Prescription    RECOMMENDATIONS FOR MDs/PROVIDERS TO ORDER:  Sent [employer] message to primary team:  \"Hello! I'm the RD taking care of Grant. I just met with him and he shares his GERD symptoms are not under control. He has significant reflux pain most of the time when eating and is another barrier to him for eating well, on top of lost taste/smell since his TBI. Was wondering if medication needed adjustment. Pt also asking about the best laxative/suppository if he becomes blocked up/constipated again. I focused on prevention measures with him via adequate hydration and increasing fiber once he's healed from this current flare. Thank you for help!\"  --Primary team replied with appreciation, and that they would look into the reflux meds and that pt will be discharged with a bowel regimen.    Malnutrition Status:    Moderate malnutrition in the context of acute illness    Recommendations already ordered by Registered Dietitian (RD):  Supplements:   -PRN Ensure Enlive  -Sent one-time sample of 2 chocolate Ensure Enlives for pt to try    Provided low-fiber diet education and low fiber room service menu. Discussed with pt that once diverticulitis flare is over, he should actually consume a higher fiber diet (rich in fruits, vegetables, and whole grains) while also drinking plenty of fluids to prevent constipation and help prevent future diverticulitis flares.    Future/Additional Recommendations:  Monitor PO adequacy, GI status, tolerance to Ensure, and wt trends     REASON FOR ASSESSMENT  Grant ROQUE Jeniffer is a/an 58 year old male assessed by the dietitian for Admission Nutrition Risk Screen for positive    Per primary team notes: \"Cyclical diarrhea and constipation. Having up to 5-6 episodes of loose stools daily...Reports 10lb weight loss over the last 2 months in setting of above symptoms. Reports poor PO intake for the last two months and essentially no PO " "intake in the last 5 days.\"    NUTRITION HISTORY  Pt not previously known to Clinical Nutrition.    Allergy to fish and mushrooms noted.    Met with pt at bedside. He shares he's been struggling with eating since his TBI in 2023, whereupon he lost his sense of taste and smell. He also suffers from GERD and often experiences painful reflux while eating which he also shares makes eating unpleasant. He shares he usually only eats once daily just to make himself stop feeling hungry, otherwise he doesn't find pleasure in eating. Unfortunately, after his hernia repair a couple months ago, he also began experiencing multiple loose stools daily and early satiety, and subsequently lost weight. For example, he used to be able to eat an entire pizza by himself, and now is full after only a quarter of it. In terms of beverages, he is not a fan of water, tea, coffee, or soda, but likes chocolate milk. He has never tried protein drinks before.     He was briefly on [employer] Whe[employer], which he found very helpful at first, but they repeatedly sent him fish despite reporting to them that he is allergic - he has lost trust in this program and no longer receives this assistance.     CURRENT NUTRITION ORDERS  Diet: Low Fiber  Intake/Tolerance: 75% of 2 meals so far this admit. He endorses GERD and loose stooling symptoms persist. He's interested in trying chocolate Ensure Enlive.     LABS  Labs reviewed  -Na+ 134 (L, hyponatremic)    MEDICATIONS  Medications reviewed  -Miralax BID  -Senna-docusate BID  -LR @ 100 mL/hr    ANTHROPOMETRICS  Height: 170.2 cm (5' 7\")  Most Recent Weight: 59.4 kg (131 lb)    IBW: 67.3 kg   BMI: 20.52 kg/m2; Normal BMI   [facility]: [facility] available in chart. However, pt report of 10 lbs wt loss (~7.1% wt loss) over past 2 months.   Wt Readings from Last 20 Encounters:   11/28/24 59.4 kg (131 lb)   01/06/19 64.2 kg (141 lb 8 oz)   04/10/14 63.5 kg (140 lb)   04/09/14 " 63.5 kg (140 lb)   11/09/13 65.8 kg (145 lb)   Dosing Weight: 59 kg (actual, based on admit wt of 59.4 kg on 11/28)    ASSESSED NUTRITION NEEDS  Estimated Energy Needs: 3576-9493 kcals/day (25 - 30 kcals/kg)  Justification: Maintenance  Estimated Protein Needs: 71-89 grams protein/day (1.2 - 1.5 grams of pro/kg)  Justification: Hypercatabolism with acute illness  Estimated Fluid Needs: 1 mL/kcal   Justification: Maintenance or Per provider pending fluid status    PHYSICAL FINDINGS  See malnutrition section below.  -+ flatus, last BM 11/28  -Some missing teeth noted  -Appears very lean    MALNUTRITION  % Intake: </= 50% for >/= 5 days (severe)  % Weight Loss: Weight loss does not meet criteria  Subcutaneous Fat Loss: Facial region:  Mild and Lower arm:  Mild  Muscle Loss: Temporal:  Mild and Thoracic region (clavicle, acromium bone, deltoid, trapezius, pectoral):  Mild - overall appears to have good muscle tone  Fluid Accumulation/Edema: None noted  Malnutrition Diagnosis: Moderate malnutrition in the context of acute illness    NUTRITION DIAGNOSIS  Unintended weight loss related to early satiety, diarrhea, GERD, and loss of taste/smell as evidenced by pt self-report of 10 lb wt loss (~7.1% wt loss) over past 2 months and report of these symptoms contributing to decreased intake.    INTERVENTIONS  Implementation  -Nutrition Education: Provided education on RD role, low fiber diet for now until diverticulitis flare is passed then transition to higher fiber for maintenance and decrease chance of future flares. Discussed adequate hydration and strongly encouraged pt drink more water (whether flavored even if just for the color change to make it more psychologically appealing despite his loss of taste/smell, and try different temperatures to see if more appealing).   -Medical food supplement therapy     Goals  Patient to consume % of nutritionally adequate meal trays TID, or the equivalent with  supplements/snacks.     Monitoring/Evaluation  Progress toward goals will be monitored and evaluated per protocol.  Whitney House RD, LD  Available on Vocera - can search by name or unit Dietitian  **Clinical Nutrition is no longer available via pager

## 2024-11-29 NOTE — PLAN OF CARE
Goal Outcome Evaluation:      Plan of Care Reviewed With: patient    Overall Patient Progress: improvingOverall Patient Progress: improving    Outcome Evaluation: A/Ox4, VSS, RA. Pt reported that stool softeners (senna and miralax) have been helping and pt reported 1 BM. LR @ 100ml/hr. Pt did not request pain medication this shift. Switched to oral abx which pt is tolerating well. No acute events occured this shift. Continue with POC.

## 2024-11-29 NOTE — PLAN OF CARE
"Blood pressure 93/62, pulse 86, temperature 98.9  F (37.2  C), temperature source Oral, resp. rate 16, height 1.702 m (5' 7\"), weight 59.4 kg (131 lb), SpO2 98%. Via RA       Patient A & O X 4 , VSS with soft BP. C/o abdominal pain and received Dilaudid IV X 2 , post assessment with relief.  Patient void adequate, report no bowel movement , passing gas. Received  Senna PRN,  offered Bisacodyl suppository , Patient declined . Up independently in room. Appetite fair , LR infusing at 100 ml/hr . Call light within reach, able to make needs known. Continue with POC and update MD with change.         Goal Outcome Evaluation:    Plan of Care Reviewed With: patient  Overall Patient Progress: improving         "

## 2024-11-29 NOTE — PROGRESS NOTES
Surgery Progress Note  11/29/2024       Subjective:  Patient's abdominal pain is better, tolerating low fiber diet without nausea or vomiting.  Continuing to passing gas, had a liquid BM with some discomfort. Voiding independently.     Objective:  Temp:  [97.8  F (36.6  C)-98.9  F (37.2  C)] 97.8  F (36.6  C)  Pulse:  [] 77  Resp:  [16-17] 16  BP: ()/(54-67) 103/67  SpO2:  [97 %-99 %] 98 %    I/O last 3 completed shifts:  In: 1560 [P.O.:740; I.V.:820]  Out: -       Constitutional: The patient is awake and alert   HEENT: Normocephalic, atraumatic.   Cardiovascular: Normal rate and rhythm, S1 normal and S2 normal.     Pulmonary/Chest: Effort normal, breath sounds clear bilaterally.     Abdominal: Soft, nondistended, minimally tenderness in lower abdomen LLQ. There is no rigidity or guarding.    Musculoskeletal:  The patient exhibits no deformity or signs of injury 2+ pulses.   Neurological: The patient is alert and oriented to person, place, time and situation.  Skin: Skin is warm and dry. No petechia and no rash. No jaundice noted.     Labs:  Recent Labs   Lab 11/29/24  0551 11/28/24  0639 11/27/24  0534   WBC 8.0 8.5 10.3   HGB 10.6* 12.0* 11.5*    380 373       Recent Labs   Lab 11/29/24  0551 11/28/24  0639 11/27/24  0534 11/26/24  1303   * 136 136 138   POTASSIUM 3.7 4.0 4.1 3.9   CHLORIDE 102 101 101 100   CO2 25 24 25 26   BUN 10.5 12.7 16.2 19.0   CR 1.06 1.18* 1.23* 1.16   GLC 88 84 88 78   GUILLERMO 8.7* 9.0 9.2 10.0   MAG 1.7 1.8  --  2.2   PHOS 2.5 2.8  --  3.3       Imaging:  CT Abdomen and Pelvis     IMPRESSION:   1. Sigmoid diverticulitis complicated by contained perforation and  abscess formation. The abscess measures 2.7 x 2.7 cm.  2. Large hiatal hernia, worsened/new since 2019. No evidence of  ischemia.     Assessment/Plan:   Grant MYA ZhaoJeniffer is a 58 year old male with a history of diverticulitis, R inguinal hernia s/p laparoscopic repair 2 months ago complicated by recurrence,  hyperlipidemia, cannabis dependence, bipolar disorder, GERD, known history of prior multiple episodes of diverticulitis, admitted with complicated sigmoid diverticulitis with intraabdominal abscess 2.7 cm. Abscess to too small for IR drain placement- continue medical management.  Progressing appropriately.    Plan:  - Bowel reg  - Low residue diet  - Continue Oral Antibiotics for 2 weeks  - OK to discharge from surgery standpoint  - Patient will need outpatient colonoscopy and colorectal surgery follow up outpatient for elective sigmoidectomy after discharge from this hospitalization    Seen, examined, and discussed with chief resident, who discussed with staff.    - - - - - - - - - - - - - - - - - -  Arpit Lala MD  General Surgery PGY-1  Pager: [phone]

## 2024-12-01 LAB — BACTERIA BLD CULT: NO GROWTH

## 2024-12-02 ENCOUNTER — PATIENT OUTREACH (OUTPATIENT)
Dept: CARE COORDINATION | Facility: CLINIC | Age: 59
End: 2024-12-02

## 2024-12-02 NOTE — PROGRESS NOTES
[facility] Contact  [facility]/Voicemail     Clinical Data: Post-Discharge Outreach     Outreach attempted x 2.  No answer, unable to lvm, Incase Pt called back to check if they have a questions/concerns and/or to schedule an appt with an [facility] provider, if they do not have a PCP.      Plan:  [facility] will do no further outreaches at this time.         BETSY Pang  [phone]  [facility]